# Patient Record
Sex: MALE | Race: WHITE | NOT HISPANIC OR LATINO | Employment: FULL TIME | URBAN - METROPOLITAN AREA
[De-identification: names, ages, dates, MRNs, and addresses within clinical notes are randomized per-mention and may not be internally consistent; named-entity substitution may affect disease eponyms.]

---

## 2017-07-17 ENCOUNTER — DOCUMENTATION ONLY (OUTPATIENT)
Dept: FAMILY MEDICINE | Facility: CLINIC | Age: 62
End: 2017-07-17

## 2017-12-29 ENCOUNTER — PATIENT OUTREACH (OUTPATIENT)
Dept: ADMINISTRATIVE | Facility: HOSPITAL | Age: 62
End: 2017-12-29

## 2017-12-29 NOTE — LETTER
December 29, 2017    Jermaine Henderson  [address] [facility] [address]             [facility]  [address]  [facility] [address]  Phone: [phone] Dear Mr. Henderson:    [facility] is committed to your overall health.  To help you get the most out of each of your visits, we will review your information to make sure you are up to date on all of your recommended tests and/or procedures.      Dr. Chrystal Hobson has found that your chart shows you may be due for shingles and flu immunizations.     If you have had any of the above done at another facility, please bring the records or information with you so that your record at [facility] will be complete.  If you would like to schedule any of these, please contact me.    If you are currently taking medication, please bring it with you to your appointment for review.    If you have any questions or concerns, please don't hesitate to call.    Thank you for letting us care for you,  Terrie Solo LPN Clinical Care Coordinator  [facility] [place]  [phone]

## 2018-02-19 ENCOUNTER — OFFICE VISIT (OUTPATIENT)
Dept: FAMILY MEDICINE | Facility: CLINIC | Age: 63
End: 2018-02-19
Payer: COMMERCIAL

## 2018-02-19 ENCOUNTER — HOSPITAL ENCOUNTER (OUTPATIENT)
Dept: RADIOLOGY | Facility: HOSPITAL | Age: 63
Discharge: HOME | End: 2018-02-19
Attending: FAMILY MEDICINE
Payer: COMMERCIAL

## 2018-02-19 VITALS
HEART RATE: 72 BPM | DIASTOLIC BLOOD PRESSURE: 94 MMHG | HEIGHT: 67 IN | RESPIRATION RATE: 16 BRPM | BODY MASS INDEX: 34.36 KG/M2 | TEMPERATURE: 99 F | WEIGHT: 218.94 LBS | SYSTOLIC BLOOD PRESSURE: 140 MMHG

## 2018-02-19 DIAGNOSIS — R05.9 COUGH: ICD-10-CM

## 2018-02-19 DIAGNOSIS — R03.0 ELEVATED BLOOD PRESSURE READING WITHOUT DIAGNOSIS OF HYPERTENSION: ICD-10-CM

## 2018-02-19 DIAGNOSIS — N52.9 ERECTILE DYSFUNCTION, UNSPECIFIED ERECTILE DYSFUNCTION TYPE: ICD-10-CM

## 2018-02-19 DIAGNOSIS — B00.9 HSV INFECTION: ICD-10-CM

## 2018-02-19 DIAGNOSIS — Z00.00 ANNUAL PHYSICAL EXAM: Primary | ICD-10-CM

## 2018-02-19 PROCEDURE — 71046 X-RAY EXAM CHEST 2 VIEWS: CPT | Mod: TC,FY,PO

## 2018-02-19 PROCEDURE — 90471 IMMUNIZATION ADMIN: CPT | Mod: S$GLB,,, | Performed by: FAMILY MEDICINE

## 2018-02-19 PROCEDURE — 99396 PREV VISIT EST AGE 40-64: CPT | Mod: 25,S$GLB,, | Performed by: FAMILY MEDICINE

## 2018-02-19 PROCEDURE — 71046 X-RAY EXAM CHEST 2 VIEWS: CPT | Mod: 26,,, | Performed by: RADIOLOGY

## 2018-02-19 PROCEDURE — 90686 IIV4 VACC NO PRSV 0.5 ML IM: CPT | Mod: S$GLB,,, | Performed by: FAMILY MEDICINE

## 2018-02-19 RX ORDER — ACYCLOVIR 400 MG/1
400 TABLET ORAL 2 TIMES DAILY
Qty: 180 TABLET | Refills: 3 | Status: SHIPPED | OUTPATIENT
Start: 2018-02-19 | End: 2019-04-22 | Stop reason: SDUPTHER

## 2018-02-19 RX ORDER — SILDENAFIL 100 MG/1
100 TABLET, FILM COATED ORAL DAILY PRN
Qty: 8 TABLET | Refills: 2 | Status: SHIPPED | OUTPATIENT
Start: 2018-02-19 | End: 2020-09-02 | Stop reason: SDUPTHER

## 2018-02-19 NOTE — PROGRESS NOTES
"Subjective:       Patient ID: Jermaine Henderson is a 62 y.o. male.    Chief Complaint: Annual Exam    HPI      Jermaine Henderson is a 61 y/o male with PMHx of OCTAVIO who presents today for his annual exam.  I have not seen him in over a year.  He has had a stressful time since I've seen him last including a job change.  Today we discussed the followin)  annual exam.  He realizes that he has gained weight.  He believes his weight gain is partially due to his job being more sedentary and to "twisting" his left knee.  He is going start exercising and be more physically active.  He is also going to be watching his diet.  He feels confident he can lose weight.  He is due for fasting labs and would be willing to have those done  2)  URI.  He was in his usual state of health until , when he began to experience nonproductive cough, wheeze, SOB, myalgias, sneezing, clear rhinorrhea and fatigue. He also believes he had a fever as he felt warm, but did not take his temperature. He denies sore throat, sinus pressure, itchy/watery eyes, headache, and chest pain.  This winter, he has had two other similar illnesses one in November and one in January. Both the November and January had the same symptoms as he is experiencing now and lasted approximately a week each before resolving.  For these illnesses in  now February, he has never had wheezing issues.  He did not seek treatment for the November are January illness, but has used mucinex for these episodes with some relief.  Of note, he has previously worked in a shipyard  3)  HSV.  He would like to continue with his acyclovir for suppression.  He does not believe he's had an outbreak since his last appt, but has experienced some pruritis in the scrotal area (which is different different from what he experiences with his outbreaks as his usual outbreaks are in the suprapubic/pubic area). He denies pain and has not noticed a rash.   4)  elevated blood " pressure.  His blood pressure 140/94 today.  He is surprised by that is that this is an unusual reading.  He realizes that his elevated readings today may be affected by his weight gain.  He denies any chest pain shortness of breath or dyspnea exertion  5)  OCTAVIO.  He is using CPAP consistently with no issues      Review of Systems   Constitutional: Positive for fatigue. Negative for appetite change, chills, diaphoresis, fever and unexpected weight change.   HENT: Positive for congestion, postnasal drip and rhinorrhea. Negative for dental problem, ear pain, sinus pressure, sneezing, sore throat and trouble swallowing.    Eyes: Negative for photophobia, pain, discharge and visual disturbance.   Respiratory: Positive for cough, shortness of breath and wheezing. Negative for chest tightness.    Cardiovascular: Negative for chest pain, palpitations and leg swelling.   Gastrointestinal: Negative for abdominal distention, abdominal pain, blood in stool, constipation, diarrhea, nausea and vomiting.   Endocrine: Negative for polydipsia and polyuria.   Genitourinary: Negative for discharge, dysuria, flank pain, frequency, genital sores, hematuria and testicular pain.   Musculoskeletal: Negative for arthralgias, joint swelling and myalgias.   Skin: Negative for rash.   Neurological: Negative for dizziness, syncope, weakness, light-headedness and headaches.   Hematological: Negative for adenopathy. Does not bruise/bleed easily.   Psychiatric/Behavioral: Negative for dysphoric mood, self-injury, sleep disturbance and suicidal ideas. The patient is not nervous/anxious.        Objective:      Physical Exam   Constitutional: He is oriented to person, place, and time. He appears well-developed and well-nourished. No distress.   HENT:   Head: Normocephalic and atraumatic.   Right Ear: Hearing, tympanic membrane, external ear and ear canal normal.   Left Ear: Hearing, tympanic membrane, external ear and ear canal normal.   Nose: Nose  normal.   Mouth/Throat: Oropharynx is clear and moist and mucous membranes are normal. No oral lesions. No posterior oropharyngeal edema or posterior oropharyngeal erythema.   Eyes: Conjunctivae, EOM and lids are normal. Pupils are equal, round, and reactive to light. No scleral icterus.   Neck: Normal range of motion. Neck supple. Carotid bruit is not present. No thyroid mass and no thyromegaly present.   Cardiovascular: Normal rate, regular rhythm and normal heart sounds.   No extrasystoles are present. PMI is not displaced.  Exam reveals no gallop.    No murmur heard.  Pulmonary/Chest: Effort normal and breath sounds normal. No accessory muscle usage. No respiratory distress.   Breath sounds were symmetrical with decreased breath sounds in the right lung field compared to the left lung field.  He also had faint terminal wheezing present at the end of expiration   Abdominal: Soft. Normal appearance and bowel sounds are normal. He exhibits no abdominal bruit. There is no hepatosplenomegaly. There is no tenderness. There is no rebound. Hernia confirmed negative in the right inguinal area and confirmed negative in the left inguinal area.   Genitourinary: Testes normal and penis normal. Right testis shows no mass, no swelling and no tenderness. Left testis shows no mass, no swelling and no tenderness. Circumcised.   Lymphadenopathy:        Head (right side): No submandibular adenopathy present.        Head (left side): No submandibular adenopathy present.        Right cervical: No superficial cervical, no deep cervical and no posterior cervical adenopathy present.       Left cervical: No superficial cervical, no deep cervical and no posterior cervical adenopathy present.        Right: No supraclavicular adenopathy present.        Left: No supraclavicular adenopathy present.   Neurological: He is alert and oriented to person, place, and time.   Skin: Skin is warm, dry and intact.   Psychiatric: He has a normal mood and  "affect.     Blood pressure (!) 140/94, pulse 72, temperature 98.8 °F (37.1 °C), temperature source Oral, resp. rate 16, height 5' 7" (1.702 m), weight 99.3 kg (218 lb 14.7 oz).Body mass index is 34.29 kg/m².          A/P:  1)  annual exam.  Health maintenance issues and anticipatory guidance issues were discussed.  He will receive his flu shot today.  We'll schedule fasting labs.  He'll work on diet exercise and weight loss  2)  persistent URI with abnormal lung exam.  New to me.  We will check a chest x-ray today and I will contact him with those results  3)  HSV.  Asymptomatic.  Continue with acyclovir which was refilled  4)  elevated blood pressure without the diagnosis of hypertension.  New to me.  We will have him back in 4 weeks recheck his blood pressure.  His blood pressure remains elevated, we will adjust his medication  5)  OCTAVIO.  Continue with CPAP  6)  ED.  I did refill his Viagra    "

## 2018-02-19 NOTE — MEDICAL/APP STUDENT
Subjective:       Patient ID: Jermaine Henderson is a 62 y.o. male.    Chief Complaint: Annual Exam    Jermaine Henderson is a 61 y/o male with PMHx of OCTAVIO who presents today with 6 days of URTI sxs and to discuss chronic medical issues.     Mr. Henderson was in his usual state of health until the 13th, when he began to experience nonproductive cough, wheeze, SOB, myalgias, sneezing, clear rhinorrhea and fatigue. He states he believes he had a fever as he felt warm, but did not take his temperature. He denies sore throat, sinus pressure, itchy/watery eyes, headache, and chest pain. He states that he has had two previous episodes like this recently, one in November, and one in January. Both episodes had the same sxs, lasted approximately a week each, and were self-limiting. He did not seek treatment, but has used mucinex for these episodes with some relief.     We also discussed the following chronic issues:     1. HSV  -Does not believe he has had an outbreak since last appt, but has experienced some pruritis in the scrotal area (this different from the area where he usually has his outbreaks, which is the suprapubic/pubic area). He denies pain and has not noticed a rash.   - He has been taking his acyclovir without issues     2. OCTAVIO  -Still using CPAP without issues   -Denies daytime sleepiness and headaches, feels his sleep has been satisfactory     3. Hypertension   -BP today is 140/94  -He states that this reading is unusual, but he has gained some weight recently   -Denies headache, blurry vision, chest pain, SOB, numbness or weakness       Review of Systems   Constitutional: Positive for activity change, diaphoresis, fatigue and fever (subjective). Negative for appetite change and chills.   HENT: Positive for congestion, postnasal drip, rhinorrhea and sneezing. Negative for sinus pain, sinus pressure, sore throat and trouble swallowing.    Eyes: Negative for visual disturbance.   Respiratory: Positive for cough,  shortness of breath and wheezing. Negative for apnea.    Cardiovascular: Negative for chest pain and palpitations.   Gastrointestinal: Negative for abdominal pain, constipation, diarrhea, nausea and vomiting.   Genitourinary: Negative for dysuria and hematuria.   Musculoskeletal: Positive for myalgias. Negative for arthralgias.   Skin: Negative for rash.   Neurological: Negative for dizziness, syncope, weakness, numbness and headaches.       Objective:       Vitals:    02/19/18 0723   BP: (!) 140/94   Pulse: 72   Resp: 16   Temp: 98.8 °F (37.1 °C)       Physical Exam   Constitutional: He is oriented to person, place, and time. No distress.   HENT:   Head: Normocephalic and atraumatic.   Right Ear: External ear normal.   Left Ear: External ear normal.   Mouth/Throat: Oropharynx is clear and moist. No oropharyngeal exudate.   Eyes: Conjunctivae and EOM are normal. Pupils are equal, round, and reactive to light. Right eye exhibits no discharge. Left eye exhibits no discharge.   Neck: No thyromegaly present.   Cardiovascular: Normal rate, regular rhythm, normal heart sounds and intact distal pulses.  Exam reveals no gallop and no friction rub.    No murmur heard.  Pulmonary/Chest: Effort normal. He has wheezes.   Coarse breath sounds on the left side, decreased on the right     Abdominal: Soft. Bowel sounds are normal. He exhibits no mass. There is no tenderness.   Lymphadenopathy:     He has no cervical adenopathy.   Neurological: He is alert and oriented to person, place, and time.   Skin: Skin is warm and dry. He is not diaphoretic.   Psychiatric: He has a normal mood and affect. His behavior is normal.       Assessment:       1. Annual physical exam    2. Cough        Plan:       1. URTI  -Likely viral etiology, continue with mucinex    -However, concerning that this is his 3rd such illness in 4 months   -CXR to r/o underlying lung disease     2. Hypertension   -BP check in 4w to determine chronicity, will consider  medication at that time if still high     3. HSV infection  -stable, continue acyclovir 400mg PO BID as before     4. OCTAVIO  -stable, continue with CPAP, continue to monitor     5. Health maintenance   -CBC, CMP, HbA1c, Lipids, TSH  -Flu shot    Payton Stein-Maxwell M4

## 2018-02-20 ENCOUNTER — TELEPHONE (OUTPATIENT)
Dept: FAMILY MEDICINE | Facility: CLINIC | Age: 63
End: 2018-02-20

## 2018-02-20 DIAGNOSIS — Z12.5 SCREENING FOR PROSTATE CANCER: ICD-10-CM

## 2018-02-20 DIAGNOSIS — E83.51 HYPOCALCEMIA: ICD-10-CM

## 2018-02-20 DIAGNOSIS — R09.89 ABNORMAL LUNG SOUNDS: Primary | ICD-10-CM

## 2018-02-20 DIAGNOSIS — Z77.090 ASBESTOS EXPOSURE: ICD-10-CM

## 2018-02-20 DIAGNOSIS — J41.1 BRONCHITIS, MUCOPURULENT RECURRENT: ICD-10-CM

## 2018-02-20 NOTE — TELEPHONE ENCOUNTER
Pls notify pt:    CXR shows right lower lung area is more closed that it should be.  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  For prediabetes, work on weight loss as this will help us from moving towards diabetes  For low Ca levels, let's check additional labs for this.

## 2018-02-22 ENCOUNTER — HOSPITAL ENCOUNTER (OUTPATIENT)
Dept: RADIOLOGY | Facility: HOSPITAL | Age: 63
Discharge: HOME | End: 2018-02-22
Attending: FAMILY MEDICINE
Payer: COMMERCIAL

## 2018-02-22 DIAGNOSIS — Z77.090 ASBESTOS EXPOSURE: ICD-10-CM

## 2018-02-22 DIAGNOSIS — J41.1 BRONCHITIS, MUCOPURULENT RECURRENT: ICD-10-CM

## 2018-02-22 DIAGNOSIS — R09.89 ABNORMAL LUNG SOUNDS: ICD-10-CM

## 2018-02-22 PROCEDURE — 71250 CT THORAX DX C-: CPT | Mod: TC,PO

## 2018-02-22 PROCEDURE — 71250 CT THORAX DX C-: CPT | Mod: 26,,, | Performed by: RADIOLOGY

## 2018-02-27 ENCOUNTER — TELEPHONE (OUTPATIENT)
Dept: FAMILY MEDICINE | Facility: CLINIC | Age: 63
End: 2018-02-27

## 2018-02-27 DIAGNOSIS — I25.84 CORONARY ARTERY CALCIFICATION: ICD-10-CM

## 2018-02-27 DIAGNOSIS — E55.9 VITAMIN D DEFICIENCY: Primary | ICD-10-CM

## 2018-02-27 DIAGNOSIS — I25.10 CORONARY ARTERY CALCIFICATION: ICD-10-CM

## 2018-02-27 RX ORDER — CHOLECALCIFEROL (VITAMIN D3) 1250 MCG
1 TABLET ORAL WEEKLY
Qty: 8 TABLET | Refills: 0 | Status: SHIPPED | OUTPATIENT
Start: 2018-02-27 | End: 2018-04-18

## 2018-02-27 NOTE — TELEPHONE ENCOUNTER
Pls notify pt:    CT shows some changes from scarring and from a condition called bronchiectasis.  I suggest you meet with the lung doctor for further eval given this finding and your recurrent illnesses this winter.  (Pls give him contact info for pulm group so he can contact them for appt and let's send his CT report to that office.)  How is your cough and chest congestion currently?    CT also shows some calcifications in the coronary arteries.  I recommend you see the cardiologist to be considered for a stress test to see if any of these calcifications are causing significant blockage    Labs show Ca level is better than was last seen but your have a low vit D level.  Let's start vitamin D 50,000 units by mouth once a week x 8  weeks and then we'll recheck a vitamin D level.    Given these findings, let's asiya fu here in ~ 8 weeks after repeat labs

## 2018-02-27 NOTE — TELEPHONE ENCOUNTER
Pt verbalized understanding of results and recommendations.   Pulmonary phone # given with [facility] pul and cardiology sched.

## 2018-02-27 NOTE — TELEPHONE ENCOUNTER
----- Message from Cammy Fu sent at 2/27/2018  7:58 AM CST -----  Contact: [phone]  Patient is requesting a call back from the nurse.  Please call the patient upon request at phone number [phone].

## 2018-03-06 ENCOUNTER — OFFICE VISIT (OUTPATIENT)
Dept: CARDIOLOGY | Facility: CLINIC | Age: 63
End: 2018-03-06
Payer: COMMERCIAL

## 2018-03-06 VITALS
DIASTOLIC BLOOD PRESSURE: 88 MMHG | BODY MASS INDEX: 35.02 KG/M2 | WEIGHT: 223.13 LBS | SYSTOLIC BLOOD PRESSURE: 148 MMHG | HEIGHT: 67 IN | HEART RATE: 88 BPM

## 2018-03-06 DIAGNOSIS — M25.569 CHRONIC KNEE PAIN, UNSPECIFIED LATERALITY: ICD-10-CM

## 2018-03-06 DIAGNOSIS — Z82.49 FAMILY HISTORY OF CORONARY ARTERY DISEASE: ICD-10-CM

## 2018-03-06 DIAGNOSIS — R03.0 ELEVATED BLOOD PRESSURE READING WITHOUT DIAGNOSIS OF HYPERTENSION: ICD-10-CM

## 2018-03-06 DIAGNOSIS — I10 ESSENTIAL HYPERTENSION: ICD-10-CM

## 2018-03-06 DIAGNOSIS — G89.29 CHRONIC KNEE PAIN, UNSPECIFIED LATERALITY: ICD-10-CM

## 2018-03-06 DIAGNOSIS — I25.84 CORONARY ARTERY CALCIFICATION: ICD-10-CM

## 2018-03-06 DIAGNOSIS — Z91.89 AT RISK FOR CORONARY ARTERY DISEASE: ICD-10-CM

## 2018-03-06 DIAGNOSIS — E66.3 OVER WEIGHT: ICD-10-CM

## 2018-03-06 DIAGNOSIS — I25.10 CORONARY ARTERY CALCIFICATION: ICD-10-CM

## 2018-03-06 PROCEDURE — 93000 ELECTROCARDIOGRAM COMPLETE: CPT | Mod: S$GLB,,, | Performed by: INTERNAL MEDICINE

## 2018-03-06 PROCEDURE — 99999 PR PBB SHADOW E&M-EST. PATIENT-LVL III: CPT | Mod: PBBFAC,,, | Performed by: INTERNAL MEDICINE

## 2018-03-06 PROCEDURE — 99204 OFFICE O/P NEW MOD 45 MIN: CPT | Mod: S$GLB,,, | Performed by: INTERNAL MEDICINE

## 2018-03-06 NOTE — LETTER
March 6, 2018      Chrystal Hobson MD  [id number] [address] [address]           [facility]  [address] [facility] [address] [place] [address]  Phone: [phone]          Patient: Jermaine Henderson   MR Number: [id number]   YOB: 1955   Date of Visit: 3/6/2018       Dear Dr. Chrystal Hobson:    Thank you for referring Jermaine Henderson to me for evaluation. Attached you will find relevant portions of my assessment and plan of care.    If you have questions, please do not hesitate to call me. I look forward to following Jermaine Henderson along with you.    Sincerely,    Macario Olmedo MD    Enclosure  CC:  No Recipients    If you would like to receive this communication electronically, please contact [email or web address] or [phone] to request more information on [employer] access.    For providers and/or their staff who would like to refer a patient to [facility], please contact us through our one-stop-shop provider referral line, [facility], at [phone].    If you feel you have received this communication in error or would no longer like to receive these types of communications, please e-mail [email or web address]

## 2018-03-06 NOTE — PROGRESS NOTES
"Subjective:    Patient ID:  Jermaine Henderson is a 62 y.o. male who presents for evaluation of Hypertension (cardiac evaluation- recent calcification on Ct scan)      Pt had a flu like illness and has a persistent cough. A chest CT was ordered and "minimal" coronary calcifications noted. He has no significant PMH or previous heart issues. He has a family h/o early CAD. He reports no symptoms at present but admits he is not very active at present. He has some persistent knee problems which also limits him at times.         Review of Systems   Constitution: Negative for weight gain and weight loss.   HENT: Negative.    Eyes: Negative.    Cardiovascular: Negative for chest pain, claudication, cyanosis, dyspnea on exertion, irregular heartbeat, leg swelling, near-syncope, orthopnea (no PND), palpitations and syncope.   Respiratory: Negative for cough, hemoptysis, shortness of breath and snoring.    Endocrine: Negative.    Skin: Negative.    Musculoskeletal: Positive for arthritis and joint pain. Negative for muscle cramps, muscle weakness and myalgias.   Gastrointestinal: Negative for diarrhea, hematemesis, nausea and vomiting.   Genitourinary: Negative.    Neurological: Negative for dizziness, focal weakness, light-headedness, loss of balance, numbness, paresthesias and seizures.   Psychiatric/Behavioral: Negative.         Objective:    Physical Exam   Constitutional: He is oriented to person, place, and time. He appears well-developed and well-nourished.   HENT:   Mouth/Throat: Oropharynx is clear and moist.   Eyes: Pupils are equal, round, and reactive to light.   Neck: Normal range of motion. No thyromegaly present.   Cardiovascular: Normal rate, regular rhythm, S1 normal, S2 normal, normal heart sounds, intact distal pulses and normal pulses.   No extrasystoles are present. PMI is not displaced.  Exam reveals no friction rub.    No murmur heard.  Pulmonary/Chest: Effort normal and breath sounds normal. He has no " wheezes. He has no rales. He exhibits no tenderness.   Abdominal: Soft. Bowel sounds are normal. He exhibits no distension and no mass. There is no tenderness.   Musculoskeletal: Normal range of motion. He exhibits no edema.   Neurological: He is alert and oriented to person, place, and time.   Skin: Skin is warm and dry.   Vitals reviewed.      Test(s) Reviewed  I have reviewed the following in detail:  [] Stress test   [] Angiography   [] Echocardiogram   [x] Labs   [x] Other:  ECG       Assessment:       1. Elevated blood pressure reading without diagnosis of hypertension    2. Coronary artery calcification    3. Family history of coronary artery disease    4. Chronic knee pain, unspecified laterality    5. Over weight    6. At risk for coronary artery disease         Plan:       We discussed the significance of coronary Ca++ as an indicator of atherosclerosis.  We discussed his family h/o CAD and his other coronary risk factors.  We discussed monitoring his BP   We discussed lifestyle changes including diet, exercise and weight loss  Echo  Pharmacologic SPECT stress

## 2018-03-08 DIAGNOSIS — I10 ESSENTIAL HYPERTENSION: Primary | ICD-10-CM

## 2018-03-15 ENCOUNTER — TELEPHONE (OUTPATIENT)
Dept: CARDIOLOGY | Facility: CLINIC | Age: 63
End: 2018-03-15

## 2018-03-15 NOTE — TELEPHONE ENCOUNTER
----- Message from Krista Mckinley sent at 3/15/2018  3:42 PM CDT -----  Contact: self   Patient want to speak with a nurse regarding upcoming appointment, want to know what should he be prepared for please call back at [phone]

## 2018-03-19 ENCOUNTER — CLINICAL SUPPORT (OUTPATIENT)
Dept: CARDIOLOGY | Facility: CLINIC | Age: 63
End: 2018-03-19
Attending: INTERNAL MEDICINE
Payer: COMMERCIAL

## 2018-03-19 ENCOUNTER — HOSPITAL ENCOUNTER (OUTPATIENT)
Dept: RADIOLOGY | Facility: HOSPITAL | Age: 63
Discharge: HOME | End: 2018-03-19
Attending: INTERNAL MEDICINE
Payer: COMMERCIAL

## 2018-03-19 DIAGNOSIS — I25.10 CORONARY ARTERY CALCIFICATION: ICD-10-CM

## 2018-03-19 DIAGNOSIS — I25.84 CORONARY ARTERY CALCIFICATION: ICD-10-CM

## 2018-03-19 DIAGNOSIS — Z91.89 AT RISK FOR CORONARY ARTERY DISEASE: ICD-10-CM

## 2018-03-19 DIAGNOSIS — M25.569 CHRONIC KNEE PAIN, UNSPECIFIED LATERALITY: ICD-10-CM

## 2018-03-19 DIAGNOSIS — Z82.49 FAMILY HISTORY OF CORONARY ARTERY DISEASE: ICD-10-CM

## 2018-03-19 DIAGNOSIS — R03.0 ELEVATED BLOOD PRESSURE READING WITHOUT DIAGNOSIS OF HYPERTENSION: ICD-10-CM

## 2018-03-19 DIAGNOSIS — E66.3 OVER WEIGHT: ICD-10-CM

## 2018-03-19 DIAGNOSIS — G89.29 CHRONIC KNEE PAIN, UNSPECIFIED LATERALITY: ICD-10-CM

## 2018-03-19 PROCEDURE — 93306 TTE W/DOPPLER COMPLETE: CPT | Mod: S$GLB,,, | Performed by: INTERNAL MEDICINE

## 2018-03-19 PROCEDURE — 93015 CV STRESS TEST SUPVJ I&R: CPT | Mod: S$GLB,,, | Performed by: INTERNAL MEDICINE

## 2018-03-19 PROCEDURE — 78452 HT MUSCLE IMAGE SPECT MULT: CPT | Mod: 26,,, | Performed by: INTERNAL MEDICINE

## 2018-03-20 LAB
DIASTOLIC DYSFUNCTION: NO
DIASTOLIC DYSFUNCTION: NO
ESTIMATED PA SYSTOLIC PRESSURE: 21.66
RETIRED EF AND QEF - SEE NOTES: 65 (ref 55–65)
TRICUSPID VALVE REGURGITATION: NORMAL

## 2018-03-21 ENCOUNTER — TELEPHONE (OUTPATIENT)
Dept: CARDIOLOGY | Facility: CLINIC | Age: 63
End: 2018-03-21

## 2018-04-14 ENCOUNTER — LAB VISIT (OUTPATIENT)
Dept: LAB | Facility: HOSPITAL | Age: 63
End: 2018-04-14
Attending: FAMILY MEDICINE
Payer: COMMERCIAL

## 2018-04-14 DIAGNOSIS — E55.9 VITAMIN D DEFICIENCY: ICD-10-CM

## 2018-04-14 LAB
25(OH)D3+25(OH)D2 SERPL-MCNC: 48 NG/ML
ANION GAP SERPL CALC-SCNC: 8 MMOL/L
BUN SERPL-MCNC: 20 MG/DL
CALCIUM SERPL-MCNC: 9.6 MG/DL
CHLORIDE SERPL-SCNC: 104 MMOL/L
CO2 SERPL-SCNC: 24 MMOL/L
CREAT SERPL-MCNC: 1.2 MG/DL
EST. GFR  (AFRICAN AMERICAN): >60 ML/MIN/1.73 M^2
EST. GFR  (NON AFRICAN AMERICAN): >60 ML/MIN/1.73 M^2
GLUCOSE SERPL-MCNC: 99 MG/DL
POTASSIUM SERPL-SCNC: 4.8 MMOL/L
SODIUM SERPL-SCNC: 136 MMOL/L

## 2018-04-14 PROCEDURE — 82306 VITAMIN D 25 HYDROXY: CPT

## 2018-04-14 PROCEDURE — 80048 BASIC METABOLIC PNL TOTAL CA: CPT

## 2018-04-16 ENCOUNTER — TELEPHONE (OUTPATIENT)
Dept: FAMILY MEDICINE | Facility: CLINIC | Age: 63
End: 2018-04-16

## 2018-04-16 NOTE — TELEPHONE ENCOUNTER
Pls notify pt:    Labs show vit D land Ca evels are back to normal    Let's start vit d 800 IUs a day

## 2018-04-16 NOTE — TELEPHONE ENCOUNTER
Pt called and advised to keep his appointment to review bp, pt confirms he will be here for his appointment

## 2018-04-16 NOTE — TELEPHONE ENCOUNTER
Spoke with pt re Vit D labs.    He had an appointment April 26th to F/U vit D. Do you need him to keep it for other reasons? He has lost 18lbs.

## 2018-04-26 ENCOUNTER — OFFICE VISIT (OUTPATIENT)
Dept: FAMILY MEDICINE | Facility: CLINIC | Age: 63
End: 2018-04-26
Payer: COMMERCIAL

## 2018-04-26 VITALS
RESPIRATION RATE: 18 BRPM | HEART RATE: 72 BPM | DIASTOLIC BLOOD PRESSURE: 90 MMHG | TEMPERATURE: 98 F | BODY MASS INDEX: 31.96 KG/M2 | WEIGHT: 203.63 LBS | SYSTOLIC BLOOD PRESSURE: 132 MMHG | HEIGHT: 67 IN

## 2018-04-26 DIAGNOSIS — I10 HYPERTENSION, ESSENTIAL: Primary | ICD-10-CM

## 2018-04-26 DIAGNOSIS — I25.10 CORONARY ARTERY CALCIFICATION: ICD-10-CM

## 2018-04-26 DIAGNOSIS — E66.9 OBESITY (BMI 30-39.9): ICD-10-CM

## 2018-04-26 DIAGNOSIS — E78.5 HYPERLIPIDEMIA, UNSPECIFIED HYPERLIPIDEMIA TYPE: ICD-10-CM

## 2018-04-26 DIAGNOSIS — E55.9 VITAMIN D DEFICIENCY: ICD-10-CM

## 2018-04-26 DIAGNOSIS — I25.84 CORONARY ARTERY CALCIFICATION: ICD-10-CM

## 2018-04-26 PROCEDURE — 3080F DIAST BP >= 90 MM HG: CPT | Mod: CPTII,S$GLB,, | Performed by: FAMILY MEDICINE

## 2018-04-26 PROCEDURE — 99214 OFFICE O/P EST MOD 30 MIN: CPT | Mod: S$GLB,,, | Performed by: FAMILY MEDICINE

## 2018-04-26 PROCEDURE — 3075F SYST BP GE 130 - 139MM HG: CPT | Mod: CPTII,S$GLB,, | Performed by: FAMILY MEDICINE

## 2018-04-26 RX ORDER — VALSARTAN 80 MG/1
80 TABLET ORAL DAILY
Qty: 30 TABLET | Refills: 1 | Status: SHIPPED | OUTPATIENT
Start: 2018-04-26 | End: 2018-06-21 | Stop reason: SDUPTHER

## 2018-04-26 RX ORDER — ROSUVASTATIN CALCIUM 10 MG/1
10 TABLET, COATED ORAL
Qty: 36 TABLET | Refills: 0 | Status: SHIPPED | OUTPATIENT
Start: 2018-04-27 | End: 2018-06-21 | Stop reason: SDUPTHER

## 2018-04-26 NOTE — PATIENT INSTRUCTIONS
The 10-year CVD risk score (Cm et al., 2008) is: 23.3%    Values used to calculate the score:      Age: 62 years      Sex: Male      Diabetic: No      Tobacco smoker: No      Systolic Blood Pressure: 132 mmHg      Is BP treated: Yes      HDL Cholesterol: 34 mg/dL      Total Cholesterol: 173 mg/dL

## 2018-04-28 NOTE — PROGRESS NOTES
Subjective:       Patient ID: Jermaine Henderson is a 62 y.o. male.    Chief Complaint: Follow-up    HPI   The patient is a 62-year-old who is here today for short-term follow-up.  Since I've seen him last, he is working hard to lose weight.  He has been counting his calories.  Since our visit in February, he has lost 15 pounds.  He believes he can lose more weight and is continuing his efforts with diet exercise and weight loss    Today we discussed the followin)  elevated blood pressure.  At his last visit, his blood pressure was elevated.  It was also elevated at a recent cardiology visit.  It is elevated again today.  We did discuss starting a medicine which he would be willing to do  2)  prediabetes.  We did discuss his recent A1c of 5.7.  He has been working to lose weight.  He denies any polyuria, polydipsia or polyphagia  3)  vitamin D deficiency.  We did discuss his recent vitamin D deficiency.  His vitamin D was 11 in February and most recently was 48.  4)  fatty liver.  We did discuss the fatty liver noted on imaging.  He is working at diet exercise and weight loss  5)  coronary artery calcifications.  He did meet with the cardiologist and had a stress test which was normal  6)  abnormal lung CT.  He did meet with the pulmonologist and his evaluation there was largely unremarkable  7)  hyperlipidemia.  We did review his recent cholesterol numbers.  His 10 year cardiovascular risk score is 23%.  Given the coronary artery calcifications, we did discuss a cholesterol medicine which he is willing to do      Review of Systems   Constitutional: Negative for appetite change, chills, diaphoresis, fatigue, fever and unexpected weight change.   HENT: Negative for congestion, ear pain, postnasal drip, rhinorrhea, sinus pressure, sneezing, sore throat and trouble swallowing.    Eyes: Negative for pain, discharge and visual disturbance.   Respiratory: Negative for cough, chest tightness, shortness of breath and  wheezing.    Cardiovascular: Negative for chest pain, palpitations and leg swelling.   Gastrointestinal: Negative for abdominal distention, abdominal pain, blood in stool, constipation, diarrhea, nausea and vomiting.   Skin: Negative for rash.       Objective:      Physical Exam   Constitutional: He is oriented to person, place, and time. He appears well-developed and well-nourished. No distress.   HENT:   Head: Normocephalic and atraumatic.   Right Ear: Hearing, tympanic membrane, external ear and ear canal normal.   Left Ear: Hearing, tympanic membrane, external ear and ear canal normal.   Nose: Nose normal.   Mouth/Throat: Oropharynx is clear and moist and mucous membranes are normal. No oral lesions. No oropharyngeal exudate, posterior oropharyngeal edema or posterior oropharyngeal erythema.   Eyes: Conjunctivae, EOM and lids are normal. Pupils are equal, round, and reactive to light. No scleral icterus.   Neck: Normal range of motion. Neck supple. Carotid bruit is not present. No thyroid mass and no thyromegaly present.   Cardiovascular: Normal rate, regular rhythm and normal heart sounds.   No extrasystoles are present. PMI is not displaced.  Exam reveals no gallop.    No murmur heard.  Pulmonary/Chest: Effort normal and breath sounds normal. No accessory muscle usage. No respiratory distress.   Clear to auscultation bilaterally.   Abdominal: Soft. Normal appearance and bowel sounds are normal. He exhibits no abdominal bruit. There is no hepatosplenomegaly. There is no tenderness. There is no rebound.   Lymphadenopathy:        Head (right side): No submental and no submandibular adenopathy present.        Head (left side): No submental and no submandibular adenopathy present.        Right cervical: No superficial cervical, no deep cervical and no posterior cervical adenopathy present.       Left cervical: No superficial cervical, no deep cervical and no posterior cervical adenopathy present.        Right: No  "supraclavicular adenopathy present.        Left: No supraclavicular adenopathy present.   Neurological: He is alert and oriented to person, place, and time.   Skin: Skin is warm, dry and intact.   Psychiatric: He has a normal mood and affect.     Blood pressure (!) 132/90, pulse 72, temperature 98.3 °F (36.8 °C), temperature source Oral, resp. rate 18, height 5' 7" (1.702 m), weight 92.4 kg (203 lb 9.6 oz).Body mass index is 31.89 kg/m².          A/P:  1)  obesity.  Improved.  I did congratulate him on the progress he has made.  He will continue his efforts with diet exercise and weight loss  2)  hypertension.  New.  We are going to start Diovan 80 mg once a day.  We will have him back to check blood pressure with the nurses in 2 weeks and we will make further adjustments if needed  3)  prediabetes.  New.  He will work on diet exercise and weight loss.  We'll recheck an A1c in 6-12 months  4)  vitamin D deficiency.  Improved.  Continue with vitamin D 800 international units once a day  5)  coronary artery calcifications.  Asymptomatic.  We will work to control risk factors for further atherosclerotic disease  6)  abnormal lung CT.  Follow up with pulmonology as recommended  7)  hyperlipidemia.  New.  We will start him on Crestor 10 mg 3 times a day.  We will recheck fasting labs in 3 months  "

## 2018-05-09 ENCOUNTER — TELEPHONE (OUTPATIENT)
Dept: FAMILY MEDICINE | Facility: CLINIC | Age: 63
End: 2018-05-09

## 2018-05-09 NOTE — TELEPHONE ENCOUNTER
----- Message from Komal Ogden sent at 5/9/2018  4:40 PM CDT -----  Contact: pt  Pt is calling states will not be able to make his nurse visit for tomorrow 5/10 needs to reschedule....[phone] (home)

## 2018-05-10 NOTE — TELEPHONE ENCOUNTER
Pt contacted, reports he is canceling his appointment for his BP check for today. Pt reports that he will call next week for scheduling.

## 2018-06-20 ENCOUNTER — TELEPHONE (OUTPATIENT)
Dept: FAMILY MEDICINE | Facility: CLINIC | Age: 63
End: 2018-06-20

## 2018-06-20 NOTE — TELEPHONE ENCOUNTER
----- Message from Isaura Blank sent at 6/20/2018  9:54 AM CDT -----  Contact: Patient  Jermaine, patient [phone] calling to schedule a nurse visit to have his blood pressure checked, requesting tomorrow around 8:15-8:30 am. Please advise. Thanks.

## 2018-06-21 ENCOUNTER — CLINICAL SUPPORT (OUTPATIENT)
Dept: FAMILY MEDICINE | Facility: CLINIC | Age: 63
End: 2018-06-21
Payer: COMMERCIAL

## 2018-06-21 DIAGNOSIS — I10 HYPERTENSION, UNSPECIFIED TYPE: Primary | ICD-10-CM

## 2018-06-21 RX ORDER — ROSUVASTATIN CALCIUM 10 MG/1
10 TABLET, COATED ORAL
Qty: 36 TABLET | Refills: 0 | Status: CANCELLED | OUTPATIENT
Start: 2018-06-22 | End: 2019-06-22

## 2018-06-21 RX ORDER — ROSUVASTATIN CALCIUM 10 MG/1
10 TABLET, COATED ORAL
Qty: 36 TABLET | Refills: 0 | Status: SHIPPED | OUTPATIENT
Start: 2018-06-22 | End: 2018-12-30 | Stop reason: SDUPTHER

## 2018-06-21 RX ORDER — VALSARTAN 80 MG/1
80 TABLET ORAL DAILY
Qty: 90 TABLET | Refills: 1 | Status: CANCELLED | OUTPATIENT
Start: 2018-06-21 | End: 2019-06-21

## 2018-06-21 RX ORDER — VALSARTAN 80 MG/1
80 TABLET ORAL DAILY
Qty: 90 TABLET | Refills: 1 | Status: SHIPPED | OUTPATIENT
Start: 2018-06-21 | End: 2018-09-25

## 2018-06-21 NOTE — PROGRESS NOTES
Jermaine GRACIA Beatriz 62 y.o. male is here today for Blood Pressure check.   History of HTN yes.    Review of patient's allergies indicates:  No Known Allergies  Creatinine   Date Value Ref Range Status   04/14/2018 1.2 0.5 - 1.4 mg/dL Final     Sodium   Date Value Ref Range Status   04/14/2018 136 136 - 145 mmol/L Final     Potassium   Date Value Ref Range Status   04/14/2018 4.8 3.5 - 5.1 mmol/L Final   ]  Patient denies taking blood pressure medications on a regular basis at the same time of the day.     Current Outpatient Prescriptions:     acyclovir (ZOVIRAX) 400 MG tablet, Take 1 tablet (400 mg total) by mouth 2 (two) times daily., Disp: 180 tablet, Rfl: 3    rosuvastatin (CRESTOR) 10 MG tablet, Take 1 tablet (10 mg total) by mouth 3 (three) times a week., Disp: 36 tablet, Rfl: 0    sildenafil (VIAGRA) 100 MG tablet, Take 1 tablet (100 mg total) by mouth daily as needed for Erectile Dysfunction., Disp: 8 tablet, Rfl: 2    valsartan (DIOVAN) 80 MG tablet, Take 1 tablet (80 mg total) by mouth once daily., Disp: 30 tablet, Rfl: 1  Does patient have record of home blood pressure readings no. Readings have been averaging .   Last dose of blood pressure medication was taken at 0600.  Patient is asymptomatic.   .      ,   .    Blood pressure reading was 112/80/  Dr. Cherry notified.

## 2018-06-25 ENCOUNTER — TELEPHONE (OUTPATIENT)
Dept: FAMILY MEDICINE | Facility: CLINIC | Age: 63
End: 2018-06-25

## 2018-06-26 NOTE — TELEPHONE ENCOUNTER
Notified pt of provider instructions. Pt stated verbal understanding. Pt inquiring about why pharmacy would not fill Crestor. Spoke with pharmacy. Pharmacy stated the earliest rx could be filled is July 5th. Notified pt. Pt stated verbal understanding. Appointment scheduled. Time and date confirmed with pt.

## 2018-07-16 RX ORDER — ROSUVASTATIN CALCIUM 10 MG/1
TABLET, COATED ORAL
Qty: 36 TABLET | Refills: 0 | Status: SHIPPED | OUTPATIENT
Start: 2018-07-16 | End: 2018-11-06 | Stop reason: SDUPTHER

## 2018-09-25 ENCOUNTER — TELEPHONE (OUTPATIENT)
Dept: FAMILY MEDICINE | Facility: CLINIC | Age: 63
End: 2018-09-25

## 2018-09-25 RX ORDER — LOSARTAN POTASSIUM 50 MG/1
50 TABLET ORAL DAILY
Qty: 30 TABLET | Refills: 0 | Status: SHIPPED | OUTPATIENT
Start: 2018-09-25 | End: 2018-10-25 | Stop reason: SDUPTHER

## 2018-09-25 NOTE — TELEPHONE ENCOUNTER
We can change to cozaar  Sandra BP check with nurses in 2 wks to see how he is doing with this dose of med

## 2018-09-25 NOTE — TELEPHONE ENCOUNTER
Spoke to pt.  He said the pharmacy stated Valsartan is recalled and requesting alternative med to be sent to his [facility] in [place].  Pt is out of his med.

## 2018-09-25 NOTE — TELEPHONE ENCOUNTER
----- Message from Krista Mckinley sent at 9/25/2018  8:20 AM CDT -----  Contact: self   Patient want to know if you can replace his valsartan was recalled per pharmacy, patient is out of medication please send new rx to [facility], any questions please call back at [phone] (home)       [facility] [address] [place] [address] [address] [place] [address] AT [address] [address]  Phone: [phone] Fax: [phone]

## 2018-10-09 ENCOUNTER — CLINICAL SUPPORT (OUTPATIENT)
Dept: FAMILY MEDICINE | Facility: CLINIC | Age: 63
End: 2018-10-09
Payer: COMMERCIAL

## 2018-10-09 DIAGNOSIS — I10 HYPERTENSION, ESSENTIAL: Primary | ICD-10-CM

## 2018-10-09 NOTE — PROGRESS NOTES
Jermaine GRACIA FloresHenderson 63 y.o. male is here today for Blood Pressure check.   History of HTN yes.    Review of patient's allergies indicates:  No Known Allergies  Creatinine   Date Value Ref Range Status   04/14/2018 1.2 0.5 - 1.4 mg/dL Final     Sodium   Date Value Ref Range Status   04/14/2018 136 136 - 145 mmol/L Final     Potassium   Date Value Ref Range Status   04/14/2018 4.8 3.5 - 5.1 mmol/L Final   ]  Patient verifies taking blood pressure medications on a regular basis at the same time of the day.     Current Outpatient Medications:     acyclovir (ZOVIRAX) 400 MG tablet, Take 1 tablet (400 mg total) by mouth 2 (two) times daily., Disp: 180 tablet, Rfl: 3    losartan (COZAAR) 50 MG tablet, Take 1 tablet (50 mg total) by mouth once daily., Disp: 30 tablet, Rfl: 0    rosuvastatin (CRESTOR) 10 MG tablet, Take 1 tablet (10 mg total) by mouth 3 (three) times a week., Disp: 36 tablet, Rfl: 0    rosuvastatin (CRESTOR) 10 MG tablet, TAKE 1 TABLET(10 MG) BY MOUTH 3 TIMES A WEEK, Disp: 36 tablet, Rfl: 0    sildenafil (VIAGRA) 100 MG tablet, Take 1 tablet (100 mg total) by mouth daily as needed for Erectile Dysfunction., Disp: 8 tablet, Rfl: 2  Does patient have record of home blood pressure readings yes. Readings have been averaging 130/80, 128/82.   Last dose of blood pressure medication was taken at 0600  Patient is asymptomatic.   Complains of na.      ,   .    Blood pressure reading 120/82  Dr. Hobson notified.

## 2018-10-10 RX ORDER — ROSUVASTATIN CALCIUM 10 MG/1
10 TABLET, COATED ORAL
Qty: 36 TABLET | Refills: 0 | Status: CANCELLED | OUTPATIENT
Start: 2018-10-10 | End: 2019-10-10

## 2018-10-10 NOTE — TELEPHONE ENCOUNTER
----- Message from Eve Trinidad sent at 10/10/2018  1:23 PM CDT -----  Type:  RX Refill Request    Who Called:  Patient   Refill or New Rx:  refill  RX Name and Strength: rosuvastatin (CRESTOR) 10 MG tablet  How is the patient currently taking it? (ex. 1XDay):  3 x week9  Is this a 30 day or 90 day RX:  90  Preferred Pharmacy with phone number:    [facility] [address] [place] [address] [address] [place] [address]  Phone: [phone] Fax: [phone]  Local or Mail Order:  local  Ordering Provider:  Chrystal Hobson  Best Call Back Number:  [phone]  Additional Information:

## 2018-10-12 NOTE — TELEPHONE ENCOUNTER
"Patient declined to reschedule labs due to "unpredictable schedule right now." Patient opted to wait until office visit on 10/19.   "

## 2018-10-25 NOTE — TELEPHONE ENCOUNTER
----- Message from Jeny Rain sent at 10/25/2018  9:01 AM CDT -----  Contact: Pt  Can the clinic reply in [facility]:      Please refill the medication(s) listed below. Please call the patient when the prescription(s) is ready for  at this phone number .[phone] (home)       Medication #1 losartan (COZAAR) 50 MG tablet      Preferred Pharmacy:   [facility] [address] [place] [id number]  Phone: [phone] Fax: [phone]

## 2018-10-26 RX ORDER — LOSARTAN POTASSIUM 50 MG/1
50 TABLET ORAL DAILY
Qty: 30 TABLET | Refills: 1 | Status: SHIPPED | OUTPATIENT
Start: 2018-10-26 | End: 2018-12-21 | Stop reason: SDUPTHER

## 2018-10-30 ENCOUNTER — LAB VISIT (OUTPATIENT)
Dept: LAB | Facility: HOSPITAL | Age: 63
End: 2018-10-30
Attending: FAMILY MEDICINE
Payer: COMMERCIAL

## 2018-10-30 ENCOUNTER — TELEPHONE (OUTPATIENT)
Dept: FAMILY MEDICINE | Facility: CLINIC | Age: 63
End: 2018-10-30

## 2018-10-30 DIAGNOSIS — E78.5 HYPERLIPIDEMIA, UNSPECIFIED HYPERLIPIDEMIA TYPE: ICD-10-CM

## 2018-10-30 LAB
ALBUMIN SERPL BCP-MCNC: 3.9 G/DL
ALP SERPL-CCNC: 58 U/L
ALT SERPL W/O P-5'-P-CCNC: 24 U/L
ANION GAP SERPL CALC-SCNC: 5 MMOL/L
AST SERPL-CCNC: 21 U/L
BILIRUB SERPL-MCNC: 1.5 MG/DL
BUN SERPL-MCNC: 16 MG/DL
CALCIUM SERPL-MCNC: 9.6 MG/DL
CHLORIDE SERPL-SCNC: 105 MMOL/L
CHOLEST SERPL-MCNC: 132 MG/DL
CHOLEST/HDLC SERPL: 2.8 {RATIO}
CO2 SERPL-SCNC: 28 MMOL/L
CREAT SERPL-MCNC: 1 MG/DL
EST. GFR  (AFRICAN AMERICAN): >60 ML/MIN/1.73 M^2
EST. GFR  (NON AFRICAN AMERICAN): >60 ML/MIN/1.73 M^2
GLUCOSE SERPL-MCNC: 104 MG/DL
HDLC SERPL-MCNC: 47 MG/DL
HDLC SERPL: 35.6 %
LDLC SERPL CALC-MCNC: 73.4 MG/DL
NONHDLC SERPL-MCNC: 85 MG/DL
POTASSIUM SERPL-SCNC: 4.4 MMOL/L
PROT SERPL-MCNC: 6.9 G/DL
SODIUM SERPL-SCNC: 138 MMOL/L
TRIGL SERPL-MCNC: 58 MG/DL

## 2018-10-30 PROCEDURE — 80053 COMPREHEN METABOLIC PANEL: CPT

## 2018-10-30 PROCEDURE — 36415 COLL VENOUS BLD VENIPUNCTURE: CPT | Mod: PO

## 2018-10-30 PROCEDURE — 80061 LIPID PANEL: CPT

## 2018-11-06 ENCOUNTER — OFFICE VISIT (OUTPATIENT)
Dept: FAMILY MEDICINE | Facility: CLINIC | Age: 63
End: 2018-11-06
Payer: COMMERCIAL

## 2018-11-06 VITALS
TEMPERATURE: 99 F | SYSTOLIC BLOOD PRESSURE: 124 MMHG | DIASTOLIC BLOOD PRESSURE: 70 MMHG | HEART RATE: 78 BPM | HEIGHT: 67 IN | BODY MASS INDEX: 32.36 KG/M2 | WEIGHT: 206.19 LBS | RESPIRATION RATE: 18 BRPM

## 2018-11-06 DIAGNOSIS — E78.5 HYPERLIPIDEMIA, UNSPECIFIED HYPERLIPIDEMIA TYPE: ICD-10-CM

## 2018-11-06 DIAGNOSIS — R73.03 PREDIABETES: ICD-10-CM

## 2018-11-06 DIAGNOSIS — Z12.5 PROSTATE CANCER SCREENING: ICD-10-CM

## 2018-11-06 DIAGNOSIS — J47.9 BRONCHIECTASIS WITHOUT COMPLICATION: ICD-10-CM

## 2018-11-06 DIAGNOSIS — I10 HYPERTENSION, ESSENTIAL: Primary | ICD-10-CM

## 2018-11-06 PROCEDURE — 3008F BODY MASS INDEX DOCD: CPT | Mod: CPTII,S$GLB,, | Performed by: FAMILY MEDICINE

## 2018-11-06 PROCEDURE — 99213 OFFICE O/P EST LOW 20 MIN: CPT | Mod: 25,S$GLB,, | Performed by: FAMILY MEDICINE

## 2018-11-06 PROCEDURE — 3078F DIAST BP <80 MM HG: CPT | Mod: CPTII,S$GLB,, | Performed by: FAMILY MEDICINE

## 2018-11-06 PROCEDURE — 3074F SYST BP LT 130 MM HG: CPT | Mod: CPTII,S$GLB,, | Performed by: FAMILY MEDICINE

## 2018-11-06 PROCEDURE — 90732 PPSV23 VACC 2 YRS+ SUBQ/IM: CPT | Mod: S$GLB,,, | Performed by: FAMILY MEDICINE

## 2018-11-06 PROCEDURE — 90686 IIV4 VACC NO PRSV 0.5 ML IM: CPT | Mod: S$GLB,,, | Performed by: FAMILY MEDICINE

## 2018-11-06 PROCEDURE — 90471 IMMUNIZATION ADMIN: CPT | Mod: S$GLB,,, | Performed by: FAMILY MEDICINE

## 2018-11-06 PROCEDURE — 90472 IMMUNIZATION ADMIN EACH ADD: CPT | Mod: S$GLB,,, | Performed by: FAMILY MEDICINE

## 2018-11-06 NOTE — PROGRESS NOTES
Subjective:       Patient ID: Jermaine Henderson is a 63 y.o. male.    Chief Complaint: Hypertension (Follow with labs prior: Flu shot today)    HPI   The patient is a 63-year-old who is here today for follow-up.  Overall, he is doing well.  Today we discussed the followin)  Hypertension.  He is doing well with the Cozaar (after having to change from the Diovan due to the med recall).  His blood pressures at home have been in the 120s over 60s.  He has not felt as if his blood pressures have been high or low  2) hyperlipidemia.  He is doing well with the Crestor.  His recent total cholesterol was 132 (down from 173) and his LDL was 73 (down from 123).  He has had no side effects with the Crestor and is willing to continue with this    Overall, he is trying to watch his weight and is trying to eat healthier and maintain his weight loss    Review of Systems   Constitutional: Negative for appetite change, chills, diaphoresis, fatigue, fever and unexpected weight change.   HENT: Negative for congestion, ear pain, postnasal drip, rhinorrhea, sinus pressure, sneezing, sore throat and trouble swallowing.    Eyes: Negative for pain, discharge and visual disturbance.   Respiratory: Negative for cough, chest tightness, shortness of breath and wheezing.    Cardiovascular: Negative for chest pain, palpitations and leg swelling.   Gastrointestinal: Negative for abdominal distention, abdominal pain, blood in stool, constipation, diarrhea, nausea and vomiting.   Skin: Negative for rash.       Objective:      Physical Exam   Constitutional: He is oriented to person, place, and time. He appears well-developed and well-nourished. No distress.   HENT:   Head: Normocephalic and atraumatic.   Right Ear: Hearing, tympanic membrane, external ear and ear canal normal.   Left Ear: Hearing, tympanic membrane, external ear and ear canal normal.   Nose: Nose normal.   Mouth/Throat: Oropharynx is clear and moist and mucous membranes are  "normal. No oral lesions. No oropharyngeal exudate, posterior oropharyngeal edema or posterior oropharyngeal erythema.   Eyes: Conjunctivae, EOM and lids are normal. Pupils are equal, round, and reactive to light. No scleral icterus.   Neck: Normal range of motion. Neck supple. Carotid bruit is not present. No thyroid mass and no thyromegaly present.   Cardiovascular: Normal rate, regular rhythm and normal heart sounds.  No extrasystoles are present. PMI is not displaced. Exam reveals no gallop.   No murmur heard.  Pulmonary/Chest: Effort normal and breath sounds normal. No accessory muscle usage. No respiratory distress.   Clear to auscultation bilaterally.   Abdominal: Soft. Normal appearance and bowel sounds are normal. He exhibits no abdominal bruit. There is no hepatosplenomegaly. There is no tenderness. There is no rebound.   Lymphadenopathy:        Head (right side): No submental and no submandibular adenopathy present.        Head (left side): No submental and no submandibular adenopathy present.        Right cervical: No superficial cervical, no deep cervical and no posterior cervical adenopathy present.       Left cervical: No superficial cervical, no deep cervical and no posterior cervical adenopathy present.        Right: No supraclavicular adenopathy present.        Left: No supraclavicular adenopathy present.   Neurological: He is alert and oriented to person, place, and time.   Skin: Skin is warm, dry and intact.   Psychiatric: He has a normal mood and affect.     Blood pressure 124/70, pulse 78, temperature 98.5 °F (36.9 °C), temperature source Oral, resp. rate 18, height 5' 7" (1.702 m), weight 93.5 kg (206 lb 3.2 oz).Body mass index is 32.3 kg/m².          A/P:  1) hypertension.  Well controlled.  Continue with Hyzaar.  If his blood pressures are consistently higher than 135/85, he will let me know  2) hyperlipidemia.  Well controlled.  Continue with Crestor.  We will recheck labs again in 6-12 " months or sooner if needed     3) pre diabetes.  We will check an A1c with next set of labs  4) health maintenance issues.  We will check a PSA with his next set of labs        As long as he does well, I will see him back in 6 months or sooner if needed

## 2018-12-21 RX ORDER — LOSARTAN POTASSIUM 50 MG/1
50 TABLET ORAL DAILY
Qty: 90 TABLET | Refills: 1 | Status: SHIPPED | OUTPATIENT
Start: 2018-12-21 | End: 2019-06-21 | Stop reason: SDUPTHER

## 2018-12-21 NOTE — TELEPHONE ENCOUNTER
----- Message from Bandar Richards sent at 12/21/2018  2:30 PM CST -----  Contact: Patient  Type:  RX Refill Request    Who Called:  patient  Refill or New Rx:  refill  RX Name and Strength:  Blood pressure medication  How is the patient currently taking it? (ex. 1XDay):    Is this a 30 day or 90 day RX:    Preferred Pharmacy with phone number:  [facility]  Local or Mail Order:  local  Ordering Provider:  Dr.Schiro Miramontes Call Back Number:  [phone]  Additional Information:  Requesting a call back when script has been sent

## 2018-12-31 RX ORDER — ROSUVASTATIN CALCIUM 10 MG/1
TABLET, COATED ORAL
Qty: 36 TABLET | Refills: 0 | Status: SHIPPED | OUTPATIENT
Start: 2018-12-31 | End: 2019-03-28 | Stop reason: SDUPTHER

## 2019-03-26 ENCOUNTER — TELEPHONE (OUTPATIENT)
Dept: FAMILY MEDICINE | Facility: CLINIC | Age: 64
End: 2019-03-26

## 2019-03-26 NOTE — TELEPHONE ENCOUNTER
----- Message from Dillon Schmidt sent at 3/22/2019  1:53 PM CDT -----  Contact: Patient  Type: Needs Medical Advice    Who Called:  Patient  Pharmacy name and phone #:    [id number] - [place], [place] - [address] [facility]  [address] [facility] [address]  Phone: [phone] Fax: [phone]  Best Call Back Number: [phone]  Additional Information: Patient would like to discuss authorizations for medications. Patient does not know the name to medications but states one is for BP and the other is for their cholesterol. Please call to verify. Thanks!

## 2019-03-29 RX ORDER — ROSUVASTATIN CALCIUM 10 MG/1
TABLET, COATED ORAL
Qty: 90 TABLET | Refills: 1 | Status: SHIPPED | OUTPATIENT
Start: 2019-03-29 | End: 2019-10-16 | Stop reason: SDUPTHER

## 2019-03-29 RX ORDER — ROSUVASTATIN CALCIUM 10 MG/1
TABLET, COATED ORAL
Qty: 36 TABLET | Refills: 2 | Status: SHIPPED | OUTPATIENT
Start: 2019-03-29 | End: 2019-06-17 | Stop reason: SDUPTHER

## 2019-03-29 NOTE — TELEPHONE ENCOUNTER
----- Message from Chio Sheehan sent at 3/29/2019  8:16 AM CDT -----  Contact: self  Patient [phone] is requesting a refill on his Crestor 10mg - take one tablet 3 times weekly/      [facility] [address] [place] [address] [address] [facility]  [address] [facility] [address]  Phone: [phone] Fax: [phone]

## 2019-03-29 NOTE — TELEPHONE ENCOUNTER
Spoke to patient, informed patient that refill request was received. Patient is also requesting a fill for 90 days instead of 30 days.

## 2019-04-22 RX ORDER — ACYCLOVIR 400 MG/1
400 TABLET ORAL 2 TIMES DAILY
Qty: 180 TABLET | Refills: 0 | Status: SHIPPED | OUTPATIENT
Start: 2019-04-22 | End: 2019-10-16 | Stop reason: SDUPTHER

## 2019-04-22 NOTE — TELEPHONE ENCOUNTER
----- Message from Melissa Holloway sent at 4/22/2019  9:56 AM CDT -----  Contact: pt [phone]  Patient called for a refill on his acyclovirr 400 mg to be called in [facility]

## 2019-06-17 NOTE — TELEPHONE ENCOUNTER
----- Message from Denise Murphy sent at 6/17/2019  2:36 PM CDT -----  Contact: Jermaine  Type:  RX Refill Request    Who Called:  patient  Refill or New Rx:  refill  RX Name and Strength:  rosuvastatin (CRESTOR) 10 MG tablet  How is the patient currently taking it? (ex. 1XDay):  As directed  Is this a 30 day or 90 day RX:  30  Preferred Pharmacy with phone number:    [facility] [address] [place] [address] [address] [place] [address]  Phone: [phone] Fax: [phone]    Local or Mail Order:  local  Ordering Provider:  Jazlyn Miramontes Call Back Number:  [phone]  Additional Information:  Please advise--thank you

## 2019-06-18 RX ORDER — ROSUVASTATIN CALCIUM 10 MG/1
TABLET, COATED ORAL
Qty: 36 TABLET | Refills: 2 | Status: SHIPPED | OUTPATIENT
Start: 2019-06-18 | End: 2019-10-09 | Stop reason: SDUPTHER

## 2019-06-23 RX ORDER — LOSARTAN POTASSIUM 50 MG/1
TABLET ORAL
Qty: 90 TABLET | Refills: 0 | Status: SHIPPED | OUTPATIENT
Start: 2019-06-23 | End: 2019-09-17 | Stop reason: SDUPTHER

## 2019-09-19 RX ORDER — LOSARTAN POTASSIUM 50 MG/1
TABLET ORAL
Qty: 90 TABLET | Refills: 0 | Status: SHIPPED | OUTPATIENT
Start: 2019-09-19 | End: 2019-10-24 | Stop reason: SDUPTHER

## 2019-09-25 ENCOUNTER — PATIENT OUTREACH (OUTPATIENT)
Dept: ADMINISTRATIVE | Facility: HOSPITAL | Age: 64
End: 2019-09-25

## 2019-10-03 ENCOUNTER — TELEPHONE (OUTPATIENT)
Dept: ADMINISTRATIVE | Facility: HOSPITAL | Age: 64
End: 2019-10-03

## 2019-10-09 ENCOUNTER — OFFICE VISIT (OUTPATIENT)
Dept: FAMILY MEDICINE | Facility: CLINIC | Age: 64
End: 2019-10-09
Payer: COMMERCIAL

## 2019-10-09 VITALS
RESPIRATION RATE: 16 BRPM | WEIGHT: 210.19 LBS | DIASTOLIC BLOOD PRESSURE: 78 MMHG | HEIGHT: 67 IN | OXYGEN SATURATION: 95 % | BODY MASS INDEX: 32.99 KG/M2 | HEART RATE: 75 BPM | TEMPERATURE: 99 F | SYSTOLIC BLOOD PRESSURE: 112 MMHG

## 2019-10-09 DIAGNOSIS — R73.03 PREDIABETES: ICD-10-CM

## 2019-10-09 DIAGNOSIS — I10 HYPERTENSION, ESSENTIAL: Primary | ICD-10-CM

## 2019-10-09 DIAGNOSIS — Z12.5 PROSTATE CANCER SCREENING: ICD-10-CM

## 2019-10-09 DIAGNOSIS — J47.9 BRONCHIECTASIS WITHOUT COMPLICATION: ICD-10-CM

## 2019-10-09 DIAGNOSIS — E78.5 HYPERLIPIDEMIA, UNSPECIFIED HYPERLIPIDEMIA TYPE: ICD-10-CM

## 2019-10-09 LAB
BASOPHILS # BLD AUTO: 0.05 K/UL (ref 0–0.2)
BASOPHILS NFR BLD: 0.7 % (ref 0–1.9)
COMPLEXED PSA SERPL-MCNC: 1.1 NG/ML (ref 0–4)
DIFFERENTIAL METHOD: ABNORMAL
EOSINOPHIL # BLD AUTO: 0.1 K/UL (ref 0–0.5)
EOSINOPHIL NFR BLD: 1.4 % (ref 0–8)
ERYTHROCYTE [DISTWIDTH] IN BLOOD BY AUTOMATED COUNT: 12.4 % (ref 11.5–14.5)
ESTIMATED AVG GLUCOSE: 111 MG/DL (ref 68–131)
HBA1C MFR BLD HPLC: 5.5 % (ref 4–5.6)
HCT VFR BLD AUTO: 44.7 % (ref 40–54)
HGB BLD-MCNC: 14.5 G/DL (ref 14–18)
IMM GRANULOCYTES # BLD AUTO: 0.04 K/UL (ref 0–0.04)
IMM GRANULOCYTES NFR BLD AUTO: 0.6 % (ref 0–0.5)
LYMPHOCYTES # BLD AUTO: 1.8 K/UL (ref 1–4.8)
LYMPHOCYTES NFR BLD: 25.8 % (ref 18–48)
MCH RBC QN AUTO: 30.9 PG (ref 27–31)
MCHC RBC AUTO-ENTMCNC: 32.4 G/DL (ref 32–36)
MCV RBC AUTO: 95 FL (ref 82–98)
MONOCYTES # BLD AUTO: 0.5 K/UL (ref 0.3–1)
MONOCYTES NFR BLD: 6.6 % (ref 4–15)
NEUTROPHILS # BLD AUTO: 4.5 K/UL (ref 1.8–7.7)
NEUTROPHILS NFR BLD: 64.9 % (ref 38–73)
NRBC BLD-RTO: 0 /100 WBC
PLATELET # BLD AUTO: 254 K/UL (ref 150–350)
PMV BLD AUTO: 9.6 FL (ref 9.2–12.9)
RBC # BLD AUTO: 4.7 M/UL (ref 4.6–6.2)
WBC # BLD AUTO: 6.97 K/UL (ref 3.9–12.7)

## 2019-10-09 PROCEDURE — 3078F DIAST BP <80 MM HG: CPT | Mod: CPTII,S$GLB,, | Performed by: FAMILY MEDICINE

## 2019-10-09 PROCEDURE — 3074F PR MOST RECENT SYSTOLIC BLOOD PRESSURE < 130 MM HG: ICD-10-PCS | Mod: CPTII,S$GLB,, | Performed by: FAMILY MEDICINE

## 2019-10-09 PROCEDURE — 80053 COMPREHEN METABOLIC PANEL: CPT

## 2019-10-09 PROCEDURE — 80061 LIPID PANEL: CPT

## 2019-10-09 PROCEDURE — 36415 COLL VENOUS BLD VENIPUNCTURE: CPT | Mod: S$GLB,,, | Performed by: FAMILY MEDICINE

## 2019-10-09 PROCEDURE — 84153 ASSAY OF PSA TOTAL: CPT

## 2019-10-09 PROCEDURE — 3008F BODY MASS INDEX DOCD: CPT | Mod: CPTII,S$GLB,, | Performed by: FAMILY MEDICINE

## 2019-10-09 PROCEDURE — 3008F PR BODY MASS INDEX (BMI) DOCUMENTED: ICD-10-PCS | Mod: CPTII,S$GLB,, | Performed by: FAMILY MEDICINE

## 2019-10-09 PROCEDURE — 99214 PR OFFICE/OUTPT VISIT, EST, LEVL IV, 30-39 MIN: ICD-10-PCS | Mod: S$GLB,,, | Performed by: FAMILY MEDICINE

## 2019-10-09 PROCEDURE — 3078F PR MOST RECENT DIASTOLIC BLOOD PRESSURE < 80 MM HG: ICD-10-PCS | Mod: CPTII,S$GLB,, | Performed by: FAMILY MEDICINE

## 2019-10-09 PROCEDURE — 36415 PR COLLECTION VENOUS BLOOD,VENIPUNCTURE: ICD-10-PCS | Mod: S$GLB,,, | Performed by: FAMILY MEDICINE

## 2019-10-09 PROCEDURE — 99214 OFFICE O/P EST MOD 30 MIN: CPT | Mod: S$GLB,,, | Performed by: FAMILY MEDICINE

## 2019-10-09 PROCEDURE — 83036 HEMOGLOBIN GLYCOSYLATED A1C: CPT

## 2019-10-09 PROCEDURE — 85025 COMPLETE CBC W/AUTO DIFF WBC: CPT

## 2019-10-09 PROCEDURE — 3074F SYST BP LT 130 MM HG: CPT | Mod: CPTII,S$GLB,, | Performed by: FAMILY MEDICINE

## 2019-10-09 NOTE — PROGRESS NOTES
Venipuncture performed with 21 gauge butterfly, x's 1 attempt,  to R Antecubital vein.  Specimens collected per orders.      Pressure dressing applied to site, instructed patient to remove dressing in 10-15 minutes, OK to re-adjust dressing if pressure causing any discomfort, to observe closely for numbness and/or discoloration to hand or fingers, and to notify provider if bleeding persists after applying constant pressure lasting 30 minutes.

## 2019-10-10 LAB
ALBUMIN SERPL BCP-MCNC: 4.5 G/DL (ref 3.5–5.2)
ALP SERPL-CCNC: 69 U/L (ref 55–135)
ALT SERPL W/O P-5'-P-CCNC: 27 U/L (ref 10–44)
ANION GAP SERPL CALC-SCNC: 9 MMOL/L (ref 8–16)
AST SERPL-CCNC: 25 U/L (ref 10–40)
BILIRUB SERPL-MCNC: 1.7 MG/DL (ref 0.1–1)
BUN SERPL-MCNC: 16 MG/DL (ref 8–23)
CALCIUM SERPL-MCNC: 9.7 MG/DL (ref 8.7–10.5)
CHLORIDE SERPL-SCNC: 103 MMOL/L (ref 95–110)
CHOLEST SERPL-MCNC: 144 MG/DL (ref 120–199)
CHOLEST/HDLC SERPL: 3.8 {RATIO} (ref 2–5)
CO2 SERPL-SCNC: 25 MMOL/L (ref 23–29)
CREAT SERPL-MCNC: 0.9 MG/DL (ref 0.5–1.4)
EST. GFR  (AFRICAN AMERICAN): >60 ML/MIN/1.73 M^2
EST. GFR  (NON AFRICAN AMERICAN): >60 ML/MIN/1.73 M^2
GLUCOSE SERPL-MCNC: 88 MG/DL (ref 70–110)
HDLC SERPL-MCNC: 38 MG/DL (ref 40–75)
HDLC SERPL: 26.4 % (ref 20–50)
LDLC SERPL CALC-MCNC: 87.4 MG/DL (ref 63–159)
NONHDLC SERPL-MCNC: 106 MG/DL
POTASSIUM SERPL-SCNC: 4.1 MMOL/L (ref 3.5–5.1)
PROT SERPL-MCNC: 7.5 G/DL (ref 6–8.4)
SODIUM SERPL-SCNC: 137 MMOL/L (ref 136–145)
TRIGL SERPL-MCNC: 93 MG/DL (ref 30–150)

## 2019-10-11 ENCOUNTER — PATIENT MESSAGE (OUTPATIENT)
Dept: FAMILY MEDICINE | Facility: CLINIC | Age: 64
End: 2019-10-11

## 2019-10-16 RX ORDER — ROSUVASTATIN CALCIUM 10 MG/1
TABLET, COATED ORAL
Qty: 36 TABLET | Refills: 2 | Status: SHIPPED | OUTPATIENT
Start: 2019-10-16 | End: 2019-11-13 | Stop reason: SDUPTHER

## 2019-10-16 RX ORDER — ACYCLOVIR 400 MG/1
400 TABLET ORAL 2 TIMES DAILY
Qty: 180 TABLET | Refills: 2 | Status: SHIPPED | OUTPATIENT
Start: 2019-10-16 | End: 2019-12-20 | Stop reason: SDUPTHER

## 2019-10-16 NOTE — TELEPHONE ENCOUNTER
----- Message from Raiza Gottlieb sent at 10/16/2019  1:28 PM CDT -----  Contact: Patient  Type: Needs Medical Advice    Who Called:  patient  Best Call Back Number: [phone] (home)   Additional Information: Patient needs a 3 month supply for the Rx Acyclovir and rosuvastatin (CRESTOR) 10 MG per his insurance It needs to be three months supply If any questions please call and advise. Patient said send to [facility] as usual.

## 2019-10-16 NOTE — PROGRESS NOTES
Subjective:       Patient ID: Jermaine Henderson is a 64 y.o. male.    Chief Complaint: Follow-up    HPI   The patient is a 64-year-old who is here today for annual follow-up.  He has been feeling quite well.  He is getting over a recent cold which has been present for the past 3 weeks.  Every time he gets a cold that goes into his chest it takes longer for him to recover but he is finally recovering now.  He does have a history of bronchiectasis and does follow with his pulmonologist    Today we discussed the followin)  Hypertension.  His blood pressure is good today.  He is taking Cozaar 50 mg which he is tolerating well   2)  Hyperlipidemia.  He is doing well with the Crestor.  He is due for fasting labs and is willing to have that done today  3) pre diabetes.  His denies any polyuria, polydipsia or polyphagia.  4) history of HSV.  He does continue with acyclovir daily for suppression.  He has not had a recent outbreak    Review of Systems   Constitutional: Negative for appetite change, chills, diaphoresis, fatigue, fever and unexpected weight change.   HENT: Negative for congestion, ear pain, postnasal drip, rhinorrhea, sinus pressure, sneezing, sore throat and trouble swallowing.    Eyes: Negative for pain, discharge and visual disturbance.   Respiratory: Negative for cough, chest tightness, shortness of breath and wheezing.    Cardiovascular: Negative for chest pain, palpitations and leg swelling.   Gastrointestinal: Negative for abdominal distention, abdominal pain, blood in stool, constipation, diarrhea, nausea and vomiting.   Skin: Negative for rash.       Objective:      Physical Exam   Constitutional: He is oriented to person, place, and time. He appears well-developed and well-nourished. No distress.   HENT:   Head: Normocephalic and atraumatic.   Right Ear: Hearing, tympanic membrane, external ear and ear canal normal.   Left Ear: Hearing, tympanic membrane, external ear and ear canal normal.  "  Nose: Nose normal.   Mouth/Throat: Oropharynx is clear and moist and mucous membranes are normal. No oral lesions. No oropharyngeal exudate, posterior oropharyngeal edema or posterior oropharyngeal erythema.   Eyes: Pupils are equal, round, and reactive to light. Conjunctivae, EOM and lids are normal. No scleral icterus.   Neck: Normal range of motion. Neck supple. Carotid bruit is not present. No thyroid mass and no thyromegaly present.   Cardiovascular: Normal rate, regular rhythm and normal heart sounds.  No extrasystoles are present. PMI is not displaced. Exam reveals no gallop.   No murmur heard.  Pulmonary/Chest: Effort normal and breath sounds normal. No accessory muscle usage. No respiratory distress.   Clear to auscultation bilaterally.   Abdominal: Soft. Normal appearance and bowel sounds are normal. He exhibits no abdominal bruit. There is no hepatosplenomegaly. There is no tenderness. There is no rebound.   Lymphadenopathy:        Head (right side): No submental and no submandibular adenopathy present.        Head (left side): No submental and no submandibular adenopathy present.        Right cervical: No superficial cervical, no deep cervical and no posterior cervical adenopathy present.       Left cervical: No superficial cervical, no deep cervical and no posterior cervical adenopathy present.        Right: No supraclavicular adenopathy present.        Left: No supraclavicular adenopathy present.   Neurological: He is alert and oriented to person, place, and time.   Skin: Skin is warm, dry and intact.   Psychiatric: He has a normal mood and affect.     Blood pressure 112/78, pulse 75, temperature 98.8 °F (37.1 °C), temperature source Oral, resp. rate 16, height 5' 7" (1.702 m), weight 95.3 kg (210 lb 3.2 oz), SpO2 95 %.Body mass index is 32.92 kg/m².          A/P:  1) hypertension.  Well controlled.  Continue Cozaar  2)   Hyperlipidemia.  Well controlled.  Continue with Crestor .  We will check his " fasting lipid profile  today  3)  Pre diabetes.  Improved.  We will check his A1c today  4)  History of HSV.  Asymptomatic.  Continue with acyclovir  5)   bronchiectasis.  Follow up with pulmonology as planned  6)  Health maintenance issues.  He will get shingles shot at the pharmacy.  He will have fasting labs today including a PSA    As long as he does well, I will see him back in 9-12 months or sooner if needed

## 2019-10-24 RX ORDER — LOSARTAN POTASSIUM 50 MG/1
TABLET ORAL
Qty: 90 TABLET | Refills: 0 | Status: SHIPPED | OUTPATIENT
Start: 2019-10-24 | End: 2019-12-17 | Stop reason: SDUPTHER

## 2019-10-24 NOTE — TELEPHONE ENCOUNTER
----- Message from Fam Freitas sent at 10/24/2019  9:00 AM CDT -----  Type:  RX Refill Request    Who Called:  Patient  Refill or New Rx:  Refill  RX Name and Strength:  losartan (COZAAR) 50 MG tablet       How is the patient currently taking it? (ex. 1XDay):  1XDay  Is this a 30 day or 90 day RX:  90  Preferred Pharmacy with phone number:    [facility]    [phone]      Local or Mail Order:  Mail Order  Ordering Provider:Jazlyn Miramontes Call Back Number:  [phone]  Additional Information:

## 2019-11-13 NOTE — TELEPHONE ENCOUNTER
----- Message from Raiza Gottlieb sent at 11/13/2019 10:07 AM CST -----  Contact: Patient  Type: Needs Medical Advice    Who Called: patient  Best Call Back Number: [phone] (home)   Additional Information: Rx for Rosuvastatin 10 mg. Said he needs it refilled with [facility] today.

## 2019-11-14 RX ORDER — ROSUVASTATIN CALCIUM 10 MG/1
TABLET, COATED ORAL
Qty: 36 TABLET | Refills: 3 | Status: SHIPPED | OUTPATIENT
Start: 2019-11-14 | End: 2020-08-14

## 2019-11-18 ENCOUNTER — TELEPHONE (OUTPATIENT)
Dept: FAMILY MEDICINE | Facility: CLINIC | Age: 64
End: 2019-11-18

## 2019-11-18 NOTE — TELEPHONE ENCOUNTER
Patient notified prescription was sent to mail order pharmacy on 11/14/19, verbalized understanding.

## 2019-11-18 NOTE — TELEPHONE ENCOUNTER
----- Message from Nayeli Andrade sent at 11/18/2019  2:22 PM CST -----  Contact: [phone]/ self   Patient requesting to speak with you regarding getting a refill on his medication rosuvastatin (CRESTOR) 10 MG tablet. Please call

## 2019-12-17 RX ORDER — LOSARTAN POTASSIUM 50 MG/1
TABLET ORAL
Qty: 90 TABLET | Refills: 1 | Status: SHIPPED | OUTPATIENT
Start: 2019-12-17 | End: 2020-06-01

## 2019-12-20 ENCOUNTER — TELEPHONE (OUTPATIENT)
Dept: FAMILY MEDICINE | Facility: CLINIC | Age: 64
End: 2019-12-20

## 2019-12-20 RX ORDER — ACYCLOVIR 400 MG/1
400 TABLET ORAL 2 TIMES DAILY
Qty: 180 TABLET | Refills: 2 | Status: SHIPPED | OUTPATIENT
Start: 2019-12-20 | End: 2020-09-02

## 2019-12-20 NOTE — TELEPHONE ENCOUNTER
----- Message from Don Jarrett sent at 12/20/2019 12:31 PM CST -----  Contact: pt  Type:  RX Refill Request    Who Called:  pt  Refill or New Rx:  refill  RX Name and Strength:  acyclovir (ZOVIRAX) 400 MG tablet  How is the patient currently taking it? (ex. 1XDay):  2 x day  Is this a 30 day or 90 day RX:  90  Preferred Pharmacy with phone number:      [facility] - [address]  [address]  [facility] [address]  Phone: [phone] Fax: [phone]    Local or Mail Order:  mail  Ordering Provider:    Best Call Back Number:  [phone]  Additional Information:  Pt has 5 days left

## 2020-05-05 ENCOUNTER — PATIENT MESSAGE (OUTPATIENT)
Dept: ADMINISTRATIVE | Facility: HOSPITAL | Age: 65
End: 2020-05-05

## 2020-06-01 RX ORDER — LOSARTAN POTASSIUM 50 MG/1
TABLET ORAL
Qty: 90 TABLET | Refills: 0 | Status: SHIPPED | OUTPATIENT
Start: 2020-06-01 | End: 2020-07-28

## 2020-07-28 RX ORDER — LOSARTAN POTASSIUM 50 MG/1
TABLET ORAL
Qty: 90 TABLET | Refills: 0 | Status: SHIPPED | OUTPATIENT
Start: 2020-07-28 | End: 2020-09-02 | Stop reason: SDUPTHER

## 2020-07-28 NOTE — TELEPHONE ENCOUNTER
Called and spoke with pt. Pt was notified of medication refill and is scheduled for follow up appt on 8/12/2020. Pt voiced understanding.

## 2020-09-02 ENCOUNTER — OFFICE VISIT (OUTPATIENT)
Dept: FAMILY MEDICINE | Facility: CLINIC | Age: 65
End: 2020-09-02
Payer: COMMERCIAL

## 2020-09-02 VITALS
OXYGEN SATURATION: 95 % | DIASTOLIC BLOOD PRESSURE: 80 MMHG | SYSTOLIC BLOOD PRESSURE: 122 MMHG | BODY MASS INDEX: 34.22 KG/M2 | HEART RATE: 76 BPM | TEMPERATURE: 99 F | HEIGHT: 67 IN | WEIGHT: 218.06 LBS

## 2020-09-02 DIAGNOSIS — E78.5 HYPERLIPIDEMIA, UNSPECIFIED HYPERLIPIDEMIA TYPE: ICD-10-CM

## 2020-09-02 DIAGNOSIS — N52.9 ERECTILE DYSFUNCTION, UNSPECIFIED ERECTILE DYSFUNCTION TYPE: ICD-10-CM

## 2020-09-02 DIAGNOSIS — Z00.00 ANNUAL PHYSICAL EXAM: Primary | ICD-10-CM

## 2020-09-02 DIAGNOSIS — I10 HYPERTENSION, ESSENTIAL: ICD-10-CM

## 2020-09-02 DIAGNOSIS — Z12.5 PROSTATE CANCER SCREENING: ICD-10-CM

## 2020-09-02 DIAGNOSIS — R73.03 PREDIABETES: ICD-10-CM

## 2020-09-02 LAB
ALBUMIN SERPL BCP-MCNC: 4.2 G/DL (ref 3.5–5.2)
ALP SERPL-CCNC: 66 U/L (ref 55–135)
ALT SERPL W/O P-5'-P-CCNC: 30 U/L (ref 10–44)
ANION GAP SERPL CALC-SCNC: 9 MMOL/L (ref 8–16)
AST SERPL-CCNC: 31 U/L (ref 10–40)
BASOPHILS # BLD AUTO: 0.04 K/UL (ref 0–0.2)
BASOPHILS NFR BLD: 0.6 % (ref 0–1.9)
BILIRUB SERPL-MCNC: 1.8 MG/DL (ref 0.1–1)
BUN SERPL-MCNC: 15 MG/DL (ref 8–23)
CALCIUM SERPL-MCNC: 8.8 MG/DL (ref 8.7–10.5)
CHLORIDE SERPL-SCNC: 107 MMOL/L (ref 95–110)
CHOLEST SERPL-MCNC: 149 MG/DL (ref 120–199)
CHOLEST/HDLC SERPL: 3.5 {RATIO} (ref 2–5)
CO2 SERPL-SCNC: 23 MMOL/L (ref 23–29)
CREAT SERPL-MCNC: 1 MG/DL (ref 0.5–1.4)
DIFFERENTIAL METHOD: NORMAL
EOSINOPHIL # BLD AUTO: 0.1 K/UL (ref 0–0.5)
EOSINOPHIL NFR BLD: 1.6 % (ref 0–8)
ERYTHROCYTE [DISTWIDTH] IN BLOOD BY AUTOMATED COUNT: 13.1 % (ref 11.5–14.5)
EST. GFR  (AFRICAN AMERICAN): >60 ML/MIN/1.73 M^2
EST. GFR  (NON AFRICAN AMERICAN): >60 ML/MIN/1.73 M^2
GLUCOSE SERPL-MCNC: 92 MG/DL (ref 70–110)
HCT VFR BLD AUTO: 43.9 % (ref 40–54)
HDLC SERPL-MCNC: 42 MG/DL (ref 40–75)
HDLC SERPL: 28.2 % (ref 20–50)
HGB BLD-MCNC: 14.5 G/DL (ref 14–18)
IMM GRANULOCYTES # BLD AUTO: 0.03 K/UL (ref 0–0.04)
IMM GRANULOCYTES NFR BLD AUTO: 0.5 % (ref 0–0.5)
LDLC SERPL CALC-MCNC: 86 MG/DL (ref 63–159)
LYMPHOCYTES # BLD AUTO: 1.8 K/UL (ref 1–4.8)
LYMPHOCYTES NFR BLD: 27.9 % (ref 18–48)
MCH RBC QN AUTO: 31 PG (ref 27–31)
MCHC RBC AUTO-ENTMCNC: 33 G/DL (ref 32–36)
MCV RBC AUTO: 94 FL (ref 82–98)
MONOCYTES # BLD AUTO: 0.5 K/UL (ref 0.3–1)
MONOCYTES NFR BLD: 7.9 % (ref 4–15)
NEUTROPHILS # BLD AUTO: 4 K/UL (ref 1.8–7.7)
NEUTROPHILS NFR BLD: 61.5 % (ref 38–73)
NONHDLC SERPL-MCNC: 107 MG/DL
NRBC BLD-RTO: 0 /100 WBC
PLATELET # BLD AUTO: 225 K/UL (ref 150–350)
PMV BLD AUTO: 9.9 FL (ref 9.2–12.9)
POTASSIUM SERPL-SCNC: 4.2 MMOL/L (ref 3.5–5.1)
PROT SERPL-MCNC: 7.1 G/DL (ref 6–8.4)
RBC # BLD AUTO: 4.68 M/UL (ref 4.6–6.2)
SODIUM SERPL-SCNC: 139 MMOL/L (ref 136–145)
TRIGL SERPL-MCNC: 105 MG/DL (ref 30–150)
TSH SERPL DL<=0.005 MIU/L-ACNC: 1.59 UIU/ML (ref 0.4–4)
WBC # BLD AUTO: 6.45 K/UL (ref 3.9–12.7)

## 2020-09-02 PROCEDURE — 3074F PR MOST RECENT SYSTOLIC BLOOD PRESSURE < 130 MM HG: ICD-10-PCS | Mod: CPTII,S$GLB,, | Performed by: FAMILY MEDICINE

## 2020-09-02 PROCEDURE — 36415 COLL VENOUS BLD VENIPUNCTURE: CPT | Mod: S$GLB,,, | Performed by: FAMILY MEDICINE

## 2020-09-02 PROCEDURE — 99396 PREV VISIT EST AGE 40-64: CPT | Mod: 25,S$GLB,, | Performed by: FAMILY MEDICINE

## 2020-09-02 PROCEDURE — 90471 FLU VACCINE (QUAD) GREATER THAN OR EQUAL TO 3YO PRESERVATIVE FREE IM: ICD-10-PCS | Mod: S$GLB,,, | Performed by: FAMILY MEDICINE

## 2020-09-02 PROCEDURE — 84153 ASSAY OF PSA TOTAL: CPT

## 2020-09-02 PROCEDURE — 3079F DIAST BP 80-89 MM HG: CPT | Mod: CPTII,S$GLB,, | Performed by: FAMILY MEDICINE

## 2020-09-02 PROCEDURE — 84443 ASSAY THYROID STIM HORMONE: CPT

## 2020-09-02 PROCEDURE — 83036 HEMOGLOBIN GLYCOSYLATED A1C: CPT

## 2020-09-02 PROCEDURE — 80061 LIPID PANEL: CPT

## 2020-09-02 PROCEDURE — 99396 PR PREVENTIVE VISIT,EST,40-64: ICD-10-PCS | Mod: 25,S$GLB,, | Performed by: FAMILY MEDICINE

## 2020-09-02 PROCEDURE — 36415 PR COLLECTION VENOUS BLOOD,VENIPUNCTURE: ICD-10-PCS | Mod: S$GLB,,, | Performed by: FAMILY MEDICINE

## 2020-09-02 PROCEDURE — 3074F SYST BP LT 130 MM HG: CPT | Mod: CPTII,S$GLB,, | Performed by: FAMILY MEDICINE

## 2020-09-02 PROCEDURE — 3079F PR MOST RECENT DIASTOLIC BLOOD PRESSURE 80-89 MM HG: ICD-10-PCS | Mod: CPTII,S$GLB,, | Performed by: FAMILY MEDICINE

## 2020-09-02 PROCEDURE — 90686 FLU VACCINE (QUAD) GREATER THAN OR EQUAL TO 3YO PRESERVATIVE FREE IM: ICD-10-PCS | Mod: S$GLB,,, | Performed by: FAMILY MEDICINE

## 2020-09-02 PROCEDURE — 90471 IMMUNIZATION ADMIN: CPT | Mod: S$GLB,,, | Performed by: FAMILY MEDICINE

## 2020-09-02 PROCEDURE — 3008F BODY MASS INDEX DOCD: CPT | Mod: CPTII,S$GLB,, | Performed by: FAMILY MEDICINE

## 2020-09-02 PROCEDURE — 90686 IIV4 VACC NO PRSV 0.5 ML IM: CPT | Mod: S$GLB,,, | Performed by: FAMILY MEDICINE

## 2020-09-02 PROCEDURE — 3008F PR BODY MASS INDEX (BMI) DOCUMENTED: ICD-10-PCS | Mod: CPTII,S$GLB,, | Performed by: FAMILY MEDICINE

## 2020-09-02 PROCEDURE — 80053 COMPREHEN METABOLIC PANEL: CPT

## 2020-09-02 PROCEDURE — 85025 COMPLETE CBC W/AUTO DIFF WBC: CPT

## 2020-09-02 RX ORDER — LOSARTAN POTASSIUM 50 MG/1
TABLET ORAL
Qty: 90 TABLET | Refills: 3 | Status: SHIPPED | OUTPATIENT
Start: 2020-09-02 | End: 2021-09-18

## 2020-09-02 RX ORDER — ROSUVASTATIN CALCIUM 10 MG/1
TABLET, COATED ORAL
Qty: 36 TABLET | Refills: 1 | Status: SHIPPED | OUTPATIENT
Start: 2020-09-02 | End: 2021-01-02

## 2020-09-02 RX ORDER — SILDENAFIL 100 MG/1
100 TABLET, FILM COATED ORAL DAILY PRN
Qty: 10 TABLET | Refills: 2 | Status: SHIPPED | OUTPATIENT
Start: 2020-09-02 | End: 2023-01-05 | Stop reason: SDUPTHER

## 2020-09-02 NOTE — PROGRESS NOTES
"Subjective:       Patient ID: Jermaine Henderson is a 65 y.o. male.    Chief Complaint: Hypertension (follow up on BP )    HPI   The patient is a 65-year-old who is here today for his chronic follow-up.  Since I have seen him last, he has gained weight.  He has "plumped up" and been "packing on a gut" during COVID.  During COVID, he has not been going to the gym.  Initially during COVID, he was drinking more alcohol but in the past month and a half he has cut back on his alcohol intake.  Regarding his drinking, does occasionally binge drink on the weekends and occasionally blacks out.   He is going to try to work on losing weight.  He is fasting today for labs.    Today we discussed the followin)  Hypertension.  Today his blood pressure is 122/80.  He is doing well with his Cozaar   2)  Hyperlipidemia.  He is doing well with the Crestor.  He is fasting today for labs    Review of Systems   Constitutional: Positive for unexpected weight change (weight gain). Negative for appetite change, chills, diaphoresis, fatigue and fever.   HENT: Negative for congestion, ear pain, postnasal drip, rhinorrhea, sinus pressure, sneezing, sore throat and trouble swallowing.    Eyes: Negative for pain, discharge and visual disturbance.   Respiratory: Negative for cough, chest tightness, shortness of breath and wheezing.    Cardiovascular: Negative for chest pain, palpitations and leg swelling.   Gastrointestinal: Negative for abdominal distention, abdominal pain, blood in stool, constipation, diarrhea, nausea and vomiting.   Skin: Negative for rash.       Objective:      Physical Exam  Constitutional:       General: He is not in acute distress.     Appearance: Normal appearance. He is well-developed.   HENT:      Head: Normocephalic and atraumatic.      Right Ear: Hearing, tympanic membrane, ear canal and external ear normal.      Left Ear: Hearing, tympanic membrane, ear canal and external ear normal.      Nose: Nose normal.    " "  Mouth/Throat:      Mouth: No oral lesions.      Pharynx: No oropharyngeal exudate or posterior oropharyngeal erythema.   Eyes:      General: Lids are normal. No scleral icterus.     Extraocular Movements: Extraocular movements intact.      Conjunctiva/sclera: Conjunctivae normal.      Pupils: Pupils are equal, round, and reactive to light.   Neck:      Musculoskeletal: Normal range of motion and neck supple.      Thyroid: No thyroid mass or thyromegaly.      Vascular: No carotid bruit.   Cardiovascular:      Rate and Rhythm: Normal rate and regular rhythm.  No extrasystoles are present.     Chest Wall: PMI is not displaced.      Heart sounds: Normal heart sounds. No murmur. No gallop.    Pulmonary:      Effort: Pulmonary effort is normal. No accessory muscle usage or respiratory distress.      Breath sounds: Normal breath sounds.   Abdominal:      General: Bowel sounds are normal. There is no abdominal bruit.      Palpations: Abdomen is soft.      Tenderness: There is no abdominal tenderness. There is no rebound.   Lymphadenopathy:      Head:      Right side of head: No submental or submandibular adenopathy.      Left side of head: No submental or submandibular adenopathy.      Cervical:      Right cervical: No superficial, deep or posterior cervical adenopathy.     Left cervical: No superficial, deep or posterior cervical adenopathy.      Upper Body:      Right upper body: No supraclavicular adenopathy.      Left upper body: No supraclavicular adenopathy.   Skin:     General: Skin is warm and dry.   Neurological:      Mental Status: He is alert and oriented to person, place, and time.      Cranial Nerves: No cranial nerve deficit.      Sensory: No sensory deficit.   Psychiatric:         Speech: Speech normal.         Behavior: Behavior normal.         Thought Content: Thought content normal.       Blood pressure 122/80, pulse 76, temperature 98.8 °F (37.1 °C), temperature source Temporal, height 5' 7" (1.702 m), " weight 98.9 kg (218 lb 0.6 oz), SpO2 95 %.Body mass index is 34.15 kg/m².          A/P:  1) hypertension.  Well controlled.  Continue with Cozaar.  He will work on diet exercise weight loss  2) hyperlipidemia.  Status unknown.  He will continue with Crestor.  We will check fasting labs today  3) obesity with BMI of 34.  He is going to work on diet exercise and weight loss.  He is also going to decrease his alcohol intake.  4)  Pre diabetes.  Status unknown.  We will check an A1c today  5)  ED.  I did refill his viagra  6)  Annual exam.  Health maintenance issues and anticipatory guidance issues were discussed. He is going to decrease his alcohol intake.  He is due for colonoscopy and we will reach out to the GI team to schedule this.  We will check fasting labs today.  He will receive his flu shot today

## 2020-09-03 LAB
COMPLEXED PSA SERPL-MCNC: 1.1 NG/ML (ref 0–4)
ESTIMATED AVG GLUCOSE: 123 MG/DL (ref 68–131)
HBA1C MFR BLD HPLC: 5.9 % (ref 4–5.6)

## 2020-09-04 ENCOUNTER — TELEPHONE (OUTPATIENT)
Dept: FAMILY MEDICINE | Facility: CLINIC | Age: 65
End: 2020-09-04

## 2020-09-04 NOTE — TELEPHONE ENCOUNTER
Pls notify pt:    Labs look good except for prediabetes which is a bit higher than prior values.  Do work on diet exercise and weight loss to help this

## 2020-09-14 ENCOUNTER — TELEPHONE (OUTPATIENT)
Dept: GASTROENTEROLOGY | Facility: CLINIC | Age: 65
End: 2020-09-14

## 2020-09-14 DIAGNOSIS — Z01.812 PRE-PROCEDURE LAB EXAM: ICD-10-CM

## 2020-09-14 NOTE — TELEPHONE ENCOUNTER
----- Message from Chrystal Hobson MD sent at 9/2/2020  8:15 AM CDT -----  Pt appears due for repeat cscope.Can you contact him to schedule?Thanks!

## 2020-11-16 ENCOUNTER — TELEPHONE (OUTPATIENT)
Dept: GASTROENTEROLOGY | Facility: CLINIC | Age: 65
End: 2020-11-16

## 2020-11-16 NOTE — TELEPHONE ENCOUNTER
----- Message from Rosemary Contreras sent at 11/16/2020  4:44 PM CST -----  Regarding: Pt Message  Type: Needs Medical Advice  Who Called: PT  Best Call Back Number: [phone]  Additional Information: Patient is requesting a call back in reference to, cancel and r/s please and Procedure. Please and thank you

## 2020-11-17 NOTE — TELEPHONE ENCOUNTER
----- Message from Payton Hahn sent at 11/17/2020  2:48 PM CST -----  Regarding: reschedule procedure  Contact: Patient  Type:  Reschedule Appointment Request      Name of Caller:  Patient  \  Best Call Back Number:  [phone]    Additional Information:     Patient requesting to reschedule colonoscopy on 11/23

## 2020-12-17 NOTE — TELEPHONE ENCOUNTER
----- Message from Jackson Bashir sent at 12/17/2020  9:46 AM CST -----  Regarding: refill  Contact: patient  Type:  RX Refill Request    Who Called:  patient  Refill or New Rx:  new    acyclovir (ZOVIRAX) 400 MG tablet (Discontinued) 180 tablet 2 12/20/2019 9/2/2020 No  Sig - Route: Take 1 tablet (400 mg total) by mouth 2 (two) times daily. - Oral    Preferred Pharmacy with phone number:      [facility] - [address]  [address]  [facility] [address]  Phone: [phone] Fax: [phone]    Ordering Provider:  Jazlyn Miramontes Call Back Number:  [phone]  Additional Information:  n/a

## 2020-12-18 NOTE — TELEPHONE ENCOUNTER
----- Message from Cammy Fu sent at 12/18/2020 10:22 AM CST -----  Regarding: refill  Contact: SANTIAGO SOL [[id number]]  Patient requesting a refill on acyclovir 400 mg.    Patient will be using   [facility] - [address]  [address]  [facility] [address]  Phone: [phone] Fax: [phone]    Please call patient at [phone]. Thanks!

## 2020-12-21 RX ORDER — ACYCLOVIR 400 MG/1
400 TABLET ORAL 2 TIMES DAILY
Qty: 180 TABLET | Refills: 2 | Status: SHIPPED | OUTPATIENT
Start: 2020-12-21 | End: 2023-01-05 | Stop reason: SDUPTHER

## 2021-02-22 ENCOUNTER — TELEPHONE (OUTPATIENT)
Dept: GASTROENTEROLOGY | Facility: CLINIC | Age: 66
End: 2021-02-22

## 2021-02-26 ENCOUNTER — LAB VISIT (OUTPATIENT)
Dept: FAMILY MEDICINE | Facility: CLINIC | Age: 66
End: 2021-02-26
Payer: COMMERCIAL

## 2021-02-26 DIAGNOSIS — Z01.812 PRE-PROCEDURE LAB EXAM: ICD-10-CM

## 2021-02-26 PROCEDURE — U0005 INFEC AGEN DETEC AMPLI PROBE: HCPCS

## 2021-02-26 PROCEDURE — U0003 INFECTIOUS AGENT DETECTION BY NUCLEIC ACID (DNA OR RNA); SEVERE ACUTE RESPIRATORY SYNDROME CORONAVIRUS 2 (SARS-COV-2) (CORONAVIRUS DISEASE [COVID-19]), AMPLIFIED PROBE TECHNIQUE, MAKING USE OF HIGH THROUGHPUT TECHNOLOGIES AS DESCRIBED BY CMS-2020-01-R: HCPCS

## 2021-02-27 LAB — SARS-COV-2 RNA RESP QL NAA+PROBE: NOT DETECTED

## 2021-03-01 ENCOUNTER — ANESTHESIA (OUTPATIENT)
Dept: ENDOSCOPY | Facility: HOSPITAL | Age: 66
End: 2021-03-01
Payer: COMMERCIAL

## 2021-03-01 ENCOUNTER — ANESTHESIA EVENT (OUTPATIENT)
Dept: ENDOSCOPY | Facility: HOSPITAL | Age: 66
End: 2021-03-01
Payer: COMMERCIAL

## 2021-03-01 ENCOUNTER — HOSPITAL ENCOUNTER (OUTPATIENT)
Facility: HOSPITAL | Age: 66
Discharge: HOME | End: 2021-03-01
Attending: INTERNAL MEDICINE | Admitting: INTERNAL MEDICINE
Payer: COMMERCIAL

## 2021-03-01 DIAGNOSIS — Z86.010 HX OF COLONIC POLYPS: ICD-10-CM

## 2021-03-01 PROBLEM — Z86.0100 HX OF COLONIC POLYPS: Status: ACTIVE | Noted: 2021-03-01

## 2021-03-01 PROCEDURE — 63600175 PHARM REV CODE 636 W HCPCS: Mod: PO | Performed by: INTERNAL MEDICINE

## 2021-03-01 PROCEDURE — 37000009 HC ANESTHESIA EA ADD 15 MINS: Mod: PO | Performed by: INTERNAL MEDICINE

## 2021-03-01 PROCEDURE — 63600175 PHARM REV CODE 636 W HCPCS: Mod: PO | Performed by: NURSE ANESTHETIST, CERTIFIED REGISTERED

## 2021-03-01 PROCEDURE — D9220A PRA ANESTHESIA: ICD-10-PCS | Mod: 33,,, | Performed by: ANESTHESIOLOGY

## 2021-03-01 PROCEDURE — 25000003 PHARM REV CODE 250: Mod: PO | Performed by: NURSE ANESTHETIST, CERTIFIED REGISTERED

## 2021-03-01 PROCEDURE — 45385 PR COLONOSCOPY,REMV LESN,SNARE: ICD-10-PCS | Mod: 33,,, | Performed by: INTERNAL MEDICINE

## 2021-03-01 PROCEDURE — 27201089 HC SNARE, DISP (ANY): Mod: PO | Performed by: INTERNAL MEDICINE

## 2021-03-01 PROCEDURE — 88305 TISSUE EXAM BY PATHOLOGIST: ICD-10-PCS | Mod: 26,,, | Performed by: PATHOLOGY

## 2021-03-01 PROCEDURE — 37000008 HC ANESTHESIA 1ST 15 MINUTES: Mod: PO | Performed by: INTERNAL MEDICINE

## 2021-03-01 PROCEDURE — 45385 COLONOSCOPY W/LESION REMOVAL: CPT | Mod: 33,,, | Performed by: INTERNAL MEDICINE

## 2021-03-01 PROCEDURE — 45385 COLONOSCOPY W/LESION REMOVAL: CPT | Mod: PO | Performed by: INTERNAL MEDICINE

## 2021-03-01 PROCEDURE — D9220A PRA ANESTHESIA: Mod: 33,,, | Performed by: ANESTHESIOLOGY

## 2021-03-01 PROCEDURE — 88305 TISSUE EXAM BY PATHOLOGIST: CPT | Performed by: PATHOLOGY

## 2021-03-01 PROCEDURE — 88305 TISSUE EXAM BY PATHOLOGIST: CPT | Mod: 26,,, | Performed by: PATHOLOGY

## 2021-03-01 RX ORDER — SODIUM CHLORIDE 0.9 % (FLUSH) 0.9 %
10 SYRINGE (ML) INJECTION
Status: DISCONTINUED | OUTPATIENT
Start: 2021-03-01 | End: 2021-03-01 | Stop reason: HOSPADM

## 2021-03-01 RX ORDER — LIDOCAINE HCL/PF 100 MG/5ML
SYRINGE (ML) INTRAVENOUS
Status: DISCONTINUED | OUTPATIENT
Start: 2021-03-01 | End: 2021-03-01

## 2021-03-01 RX ORDER — PROPOFOL 10 MG/ML
VIAL (ML) INTRAVENOUS CONTINUOUS PRN
Status: DISCONTINUED | OUTPATIENT
Start: 2021-03-01 | End: 2021-03-01

## 2021-03-01 RX ORDER — SODIUM CHLORIDE, SODIUM LACTATE, POTASSIUM CHLORIDE, CALCIUM CHLORIDE 600; 310; 30; 20 MG/100ML; MG/100ML; MG/100ML; MG/100ML
INJECTION, SOLUTION INTRAVENOUS CONTINUOUS
Status: DISCONTINUED | OUTPATIENT
Start: 2021-03-01 | End: 2021-03-01 | Stop reason: HOSPADM

## 2021-03-01 RX ORDER — PROPOFOL 10 MG/ML
VIAL (ML) INTRAVENOUS
Status: DISCONTINUED | OUTPATIENT
Start: 2021-03-01 | End: 2021-03-01

## 2021-03-01 RX ADMIN — LIDOCAINE HYDROCHLORIDE 100 MG: 20 INJECTION, SOLUTION INTRAVENOUS at 08:03

## 2021-03-01 RX ADMIN — PROPOFOL 150 MCG/KG/MIN: 10 INJECTION, EMULSION INTRAVENOUS at 08:03

## 2021-03-01 RX ADMIN — SODIUM CHLORIDE, SODIUM LACTATE, POTASSIUM CHLORIDE, AND CALCIUM CHLORIDE: .6; .31; .03; .02 INJECTION, SOLUTION INTRAVENOUS at 07:03

## 2021-03-01 RX ADMIN — PROPOFOL 100 MG: 10 INJECTION, EMULSION INTRAVENOUS at 08:03

## 2021-03-02 VITALS
TEMPERATURE: 98 F | OXYGEN SATURATION: 98 % | WEIGHT: 205 LBS | RESPIRATION RATE: 16 BRPM | SYSTOLIC BLOOD PRESSURE: 121 MMHG | DIASTOLIC BLOOD PRESSURE: 78 MMHG | BODY MASS INDEX: 32.18 KG/M2 | HEIGHT: 67 IN | HEART RATE: 70 BPM

## 2021-03-08 LAB
FINAL PATHOLOGIC DIAGNOSIS: NORMAL
Lab: NORMAL

## 2021-06-02 ENCOUNTER — TELEPHONE (OUTPATIENT)
Dept: FAMILY MEDICINE | Facility: CLINIC | Age: 66
End: 2021-06-02

## 2021-06-02 DIAGNOSIS — Z20.822 ENCOUNTER FOR LABORATORY TESTING FOR COVID-19 VIRUS: ICD-10-CM

## 2021-06-07 ENCOUNTER — TELEPHONE (OUTPATIENT)
Dept: FAMILY MEDICINE | Facility: CLINIC | Age: 66
End: 2021-06-07

## 2021-06-16 ENCOUNTER — CLINICAL SUPPORT (OUTPATIENT)
Dept: URGENT CARE | Facility: CLINIC | Age: 66
End: 2021-06-16
Payer: COMMERCIAL

## 2021-06-16 DIAGNOSIS — Z20.822 CONTACT WITH AND (SUSPECTED) EXPOSURE TO COVID-19: ICD-10-CM

## 2021-06-16 LAB — SARS-COV-2 RNA RESP QL NAA+PROBE: NOT DETECTED

## 2021-06-16 PROCEDURE — U0005 INFEC AGEN DETEC AMPLI PROBE: HCPCS | Performed by: FAMILY MEDICINE

## 2021-06-16 PROCEDURE — 99211 PR OFFICE/OUTPT VISIT, EST, LEVL I: ICD-10-PCS | Mod: S$GLB,,, | Performed by: FAMILY MEDICINE

## 2021-06-16 PROCEDURE — U0003 INFECTIOUS AGENT DETECTION BY NUCLEIC ACID (DNA OR RNA); SEVERE ACUTE RESPIRATORY SYNDROME CORONAVIRUS 2 (SARS-COV-2) (CORONAVIRUS DISEASE [COVID-19]), AMPLIFIED PROBE TECHNIQUE, MAKING USE OF HIGH THROUGHPUT TECHNOLOGIES AS DESCRIBED BY CMS-2020-01-R: HCPCS | Performed by: FAMILY MEDICINE

## 2021-06-16 PROCEDURE — 99211 OFF/OP EST MAY X REQ PHY/QHP: CPT | Mod: S$GLB,,, | Performed by: FAMILY MEDICINE

## 2021-06-17 ENCOUNTER — TELEPHONE (OUTPATIENT)
Dept: URGENT CARE | Facility: CLINIC | Age: 66
End: 2021-06-17

## 2021-08-16 ENCOUNTER — TELEPHONE (OUTPATIENT)
Dept: FAMILY MEDICINE | Facility: CLINIC | Age: 66
End: 2021-08-16

## 2021-08-16 DIAGNOSIS — U07.1 COVID-19: Primary | ICD-10-CM

## 2021-08-23 ENCOUNTER — TELEPHONE (OUTPATIENT)
Dept: FAMILY MEDICINE | Facility: CLINIC | Age: 66
End: 2021-08-23

## 2021-08-25 ENCOUNTER — TELEPHONE (OUTPATIENT)
Dept: FAMILY MEDICINE | Facility: CLINIC | Age: 66
End: 2021-08-25

## 2021-08-25 ENCOUNTER — PATIENT MESSAGE (OUTPATIENT)
Dept: FAMILY MEDICINE | Facility: CLINIC | Age: 66
End: 2021-08-25

## 2021-09-18 RX ORDER — LOSARTAN POTASSIUM 50 MG/1
TABLET ORAL
Qty: 90 TABLET | Refills: 0 | Status: SHIPPED | OUTPATIENT
Start: 2021-09-18 | End: 2021-11-28

## 2021-10-19 ENCOUNTER — OFFICE VISIT (OUTPATIENT)
Dept: FAMILY MEDICINE | Facility: CLINIC | Age: 66
End: 2021-10-19
Payer: COMMERCIAL

## 2021-10-19 VITALS
OXYGEN SATURATION: 98 % | SYSTOLIC BLOOD PRESSURE: 120 MMHG | RESPIRATION RATE: 18 BRPM | BODY MASS INDEX: 29.52 KG/M2 | TEMPERATURE: 98 F | WEIGHT: 188.5 LBS | HEART RATE: 74 BPM | DIASTOLIC BLOOD PRESSURE: 68 MMHG

## 2021-10-19 DIAGNOSIS — Z00.00 ANNUAL PHYSICAL EXAM: Primary | ICD-10-CM

## 2021-10-19 DIAGNOSIS — E78.5 HYPERLIPIDEMIA, UNSPECIFIED HYPERLIPIDEMIA TYPE: ICD-10-CM

## 2021-10-19 DIAGNOSIS — I10 HYPERTENSION, ESSENTIAL: ICD-10-CM

## 2021-10-19 LAB
25(OH)D3+25(OH)D2 SERPL-MCNC: 19 NG/ML (ref 30–96)
ALBUMIN SERPL BCP-MCNC: 4.2 G/DL (ref 3.5–5.2)
ALP SERPL-CCNC: 59 U/L (ref 55–135)
ALT SERPL W/O P-5'-P-CCNC: 18 U/L (ref 10–44)
ANION GAP SERPL CALC-SCNC: 12 MMOL/L (ref 8–16)
AST SERPL-CCNC: 26 U/L (ref 10–40)
BASOPHILS # BLD AUTO: 0.04 K/UL (ref 0–0.2)
BASOPHILS NFR BLD: 0.7 % (ref 0–1.9)
BILIRUB SERPL-MCNC: 2 MG/DL (ref 0.1–1)
BUN SERPL-MCNC: 9 MG/DL (ref 8–23)
CALCIUM SERPL-MCNC: 9.6 MG/DL (ref 8.7–10.5)
CHLORIDE SERPL-SCNC: 106 MMOL/L (ref 95–110)
CHOLEST SERPL-MCNC: 153 MG/DL (ref 120–199)
CHOLEST/HDLC SERPL: 2.6 {RATIO} (ref 2–5)
CO2 SERPL-SCNC: 22 MMOL/L (ref 23–29)
COMPLEXED PSA SERPL-MCNC: 1.1 NG/ML (ref 0–4)
CREAT SERPL-MCNC: 0.8 MG/DL (ref 0.5–1.4)
DIFFERENTIAL METHOD: ABNORMAL
EOSINOPHIL # BLD AUTO: 0.1 K/UL (ref 0–0.5)
EOSINOPHIL NFR BLD: 2.1 % (ref 0–8)
ERYTHROCYTE [DISTWIDTH] IN BLOOD BY AUTOMATED COUNT: 13.2 % (ref 11.5–14.5)
EST. GFR  (AFRICAN AMERICAN): >60 ML/MIN/1.73 M^2
EST. GFR  (NON AFRICAN AMERICAN): >60 ML/MIN/1.73 M^2
ESTIMATED AVG GLUCOSE: 108 MG/DL (ref 68–131)
GLUCOSE SERPL-MCNC: 87 MG/DL (ref 70–110)
HBA1C MFR BLD: 5.4 % (ref 4–5.6)
HCT VFR BLD AUTO: 40 % (ref 40–54)
HDLC SERPL-MCNC: 60 MG/DL (ref 40–75)
HDLC SERPL: 39.2 % (ref 20–50)
HGB BLD-MCNC: 13.6 G/DL (ref 14–18)
IMM GRANULOCYTES # BLD AUTO: 0.02 K/UL (ref 0–0.04)
IMM GRANULOCYTES NFR BLD AUTO: 0.4 % (ref 0–0.5)
LDLC SERPL CALC-MCNC: 76.6 MG/DL (ref 63–159)
LYMPHOCYTES # BLD AUTO: 1.5 K/UL (ref 1–4.8)
LYMPHOCYTES NFR BLD: 26.4 % (ref 18–48)
MCH RBC QN AUTO: 31.6 PG (ref 27–31)
MCHC RBC AUTO-ENTMCNC: 34 G/DL (ref 32–36)
MCV RBC AUTO: 93 FL (ref 82–98)
MONOCYTES # BLD AUTO: 0.4 K/UL (ref 0.3–1)
MONOCYTES NFR BLD: 7 % (ref 4–15)
NEUTROPHILS # BLD AUTO: 3.6 K/UL (ref 1.8–7.7)
NEUTROPHILS NFR BLD: 63.4 % (ref 38–73)
NONHDLC SERPL-MCNC: 93 MG/DL
NRBC BLD-RTO: 0 /100 WBC
PLATELET # BLD AUTO: 224 K/UL (ref 150–450)
PMV BLD AUTO: 10 FL (ref 9.2–12.9)
POTASSIUM SERPL-SCNC: 3.9 MMOL/L (ref 3.5–5.1)
PROT SERPL-MCNC: 7.2 G/DL (ref 6–8.4)
RBC # BLD AUTO: 4.31 M/UL (ref 4.6–6.2)
SODIUM SERPL-SCNC: 140 MMOL/L (ref 136–145)
TRIGL SERPL-MCNC: 82 MG/DL (ref 30–150)
TSH SERPL DL<=0.005 MIU/L-ACNC: 1.52 UIU/ML (ref 0.4–4)
WBC # BLD AUTO: 5.69 K/UL (ref 3.9–12.7)

## 2021-10-19 PROCEDURE — 90472 TDAP VACCINE GREATER THAN OR EQUAL TO 7YO IM: ICD-10-PCS | Mod: S$GLB,,, | Performed by: FAMILY MEDICINE

## 2021-10-19 PROCEDURE — 1159F MED LIST DOCD IN RCRD: CPT | Mod: CPTII,S$GLB,, | Performed by: FAMILY MEDICINE

## 2021-10-19 PROCEDURE — 4010F PR ACE/ARB THEARPY RXD/TAKEN: ICD-10-PCS | Mod: CPTII,S$GLB,, | Performed by: FAMILY MEDICINE

## 2021-10-19 PROCEDURE — 90686 IIV4 VACC NO PRSV 0.5 ML IM: CPT | Mod: S$GLB,,, | Performed by: FAMILY MEDICINE

## 2021-10-19 PROCEDURE — 4010F ACE/ARB THERAPY RXD/TAKEN: CPT | Mod: CPTII,S$GLB,, | Performed by: FAMILY MEDICINE

## 2021-10-19 PROCEDURE — 90715 TDAP VACCINE GREATER THAN OR EQUAL TO 7YO IM: ICD-10-PCS | Mod: S$GLB,,, | Performed by: FAMILY MEDICINE

## 2021-10-19 PROCEDURE — 3074F SYST BP LT 130 MM HG: CPT | Mod: CPTII,S$GLB,, | Performed by: FAMILY MEDICINE

## 2021-10-19 PROCEDURE — 3078F DIAST BP <80 MM HG: CPT | Mod: CPTII,S$GLB,, | Performed by: FAMILY MEDICINE

## 2021-10-19 PROCEDURE — 1160F RVW MEDS BY RX/DR IN RCRD: CPT | Mod: CPTII,S$GLB,, | Performed by: FAMILY MEDICINE

## 2021-10-19 PROCEDURE — 1159F PR MEDICATION LIST DOCUMENTED IN MEDICAL RECORD: ICD-10-PCS | Mod: CPTII,S$GLB,, | Performed by: FAMILY MEDICINE

## 2021-10-19 PROCEDURE — 82306 VITAMIN D 25 HYDROXY: CPT | Performed by: FAMILY MEDICINE

## 2021-10-19 PROCEDURE — 36415 COLL VENOUS BLD VENIPUNCTURE: CPT | Mod: S$GLB,,, | Performed by: FAMILY MEDICINE

## 2021-10-19 PROCEDURE — 90471 IMMUNIZATION ADMIN: CPT | Mod: S$GLB,,, | Performed by: FAMILY MEDICINE

## 2021-10-19 PROCEDURE — 3078F PR MOST RECENT DIASTOLIC BLOOD PRESSURE < 80 MM HG: ICD-10-PCS | Mod: CPTII,S$GLB,, | Performed by: FAMILY MEDICINE

## 2021-10-19 PROCEDURE — 84443 ASSAY THYROID STIM HORMONE: CPT | Performed by: FAMILY MEDICINE

## 2021-10-19 PROCEDURE — 3044F PR MOST RECENT HEMOGLOBIN A1C LEVEL <7.0%: ICD-10-PCS | Mod: CPTII,S$GLB,, | Performed by: FAMILY MEDICINE

## 2021-10-19 PROCEDURE — 80061 LIPID PANEL: CPT | Performed by: FAMILY MEDICINE

## 2021-10-19 PROCEDURE — 90686 FLU VACCINE (QUAD) GREATER THAN OR EQUAL TO 3YO PRESERVATIVE FREE IM: ICD-10-PCS | Mod: S$GLB,,, | Performed by: FAMILY MEDICINE

## 2021-10-19 PROCEDURE — 3008F BODY MASS INDEX DOCD: CPT | Mod: CPTII,S$GLB,, | Performed by: FAMILY MEDICINE

## 2021-10-19 PROCEDURE — 90715 TDAP VACCINE 7 YRS/> IM: CPT | Mod: S$GLB,,, | Performed by: FAMILY MEDICINE

## 2021-10-19 PROCEDURE — 99396 PREV VISIT EST AGE 40-64: CPT | Mod: 25,S$GLB,, | Performed by: FAMILY MEDICINE

## 2021-10-19 PROCEDURE — 3074F PR MOST RECENT SYSTOLIC BLOOD PRESSURE < 130 MM HG: ICD-10-PCS | Mod: CPTII,S$GLB,, | Performed by: FAMILY MEDICINE

## 2021-10-19 PROCEDURE — 3044F HG A1C LEVEL LT 7.0%: CPT | Mod: CPTII,S$GLB,, | Performed by: FAMILY MEDICINE

## 2021-10-19 PROCEDURE — 83036 HEMOGLOBIN GLYCOSYLATED A1C: CPT | Performed by: FAMILY MEDICINE

## 2021-10-19 PROCEDURE — 80053 COMPREHEN METABOLIC PANEL: CPT | Performed by: FAMILY MEDICINE

## 2021-10-19 PROCEDURE — 3008F PR BODY MASS INDEX (BMI) DOCUMENTED: ICD-10-PCS | Mod: CPTII,S$GLB,, | Performed by: FAMILY MEDICINE

## 2021-10-19 PROCEDURE — 90472 IMMUNIZATION ADMIN EACH ADD: CPT | Mod: S$GLB,,, | Performed by: FAMILY MEDICINE

## 2021-10-19 PROCEDURE — 90471 FLU VACCINE (QUAD) GREATER THAN OR EQUAL TO 3YO PRESERVATIVE FREE IM: ICD-10-PCS | Mod: S$GLB,,, | Performed by: FAMILY MEDICINE

## 2021-10-19 PROCEDURE — 99396 PR PREVENTIVE VISIT,EST,40-64: ICD-10-PCS | Mod: 25,S$GLB,, | Performed by: FAMILY MEDICINE

## 2021-10-19 PROCEDURE — 36415 PR COLLECTION VENOUS BLOOD,VENIPUNCTURE: ICD-10-PCS | Mod: S$GLB,,, | Performed by: FAMILY MEDICINE

## 2021-10-19 PROCEDURE — 84153 ASSAY OF PSA TOTAL: CPT | Performed by: FAMILY MEDICINE

## 2021-10-19 PROCEDURE — 85025 COMPLETE CBC W/AUTO DIFF WBC: CPT | Performed by: FAMILY MEDICINE

## 2021-10-19 PROCEDURE — 1160F PR REVIEW ALL MEDS BY PRESCRIBER/CLIN PHARMACIST DOCUMENTED: ICD-10-PCS | Mod: CPTII,S$GLB,, | Performed by: FAMILY MEDICINE

## 2021-11-06 ENCOUNTER — PATIENT MESSAGE (OUTPATIENT)
Dept: FAMILY MEDICINE | Facility: CLINIC | Age: 66
End: 2021-11-06
Payer: COMMERCIAL

## 2022-10-04 ENCOUNTER — TELEPHONE (OUTPATIENT)
Dept: FAMILY MEDICINE | Facility: CLINIC | Age: 67
End: 2022-10-04
Payer: MEDICARE

## 2022-10-04 DIAGNOSIS — Z00.00 ANNUAL PHYSICAL EXAM: Primary | ICD-10-CM

## 2022-10-04 RX ORDER — ROSUVASTATIN CALCIUM 10 MG/1
TABLET, COATED ORAL
Qty: 36 TABLET | Refills: 0 | Status: SHIPPED | OUTPATIENT
Start: 2022-10-04 | End: 2022-12-16

## 2022-10-04 NOTE — TELEPHONE ENCOUNTER
Refill Decision Note   Jermaine Henderson  is requesting a refill authorization.  Brief Assessment and Rationale for Refill:  Approve    -Medication-Related Problems Identified:   Requires appointment  Requires labs  Medication Therapy Plan:  Due for annual w/ PCP, Labs (CBC, CMP, lipid panel)    Medication Reconciliation Completed: No   Comments:     Provider Staff:     Action is required for this patient.   Please see care gap opportunities below in Care Due Message.     Thanks!  [facility]     Appointments      Date Provider   Last Visit   10/19/2021 Chrystal Hobson MD   Next Visit   Visit date not found Chrystal Hobson MD     Note composed:5:14 PM 10/04/2022           Note composed:5:14 PM 10/04/2022

## 2022-10-04 NOTE — TELEPHONE ENCOUNTER
Care Due:                  Date            Visit Type   Department     Provider  --------------------------------------------------------------------------------                                EP -                              PRIMARY      [facility]    Chrystal Hobson  Last Visit: 10-      CARE ([facility])   MEDICINE       [facility]  Next Visit: None Scheduled  None         None Found                                                            Last  Test          Frequency    Reason                     Performed    Due Date  --------------------------------------------------------------------------------    Office Visit  12 months..  acyclovir, losartan,       10-   10-                             rosuvastatin.............    CBC.........  12 months..  acyclovir................  10-   10-    CMP.........  12 months..  acyclovir, losartan,       10-   10-                             rosuvastatin.............    Lipid Panel.  12 months..  rosuvastatin.............  10-   10-    [facility] Embedded Care Gaps. Reference number: [id number]. 10/04/2022   5:00:30 PM CDT

## 2022-10-05 ENCOUNTER — PATIENT MESSAGE (OUTPATIENT)
Dept: FAMILY MEDICINE | Facility: CLINIC | Age: 67
End: 2022-10-05
Payer: MEDICARE

## 2022-10-17 ENCOUNTER — PATIENT MESSAGE (OUTPATIENT)
Dept: FAMILY MEDICINE | Facility: CLINIC | Age: 67
End: 2022-10-17
Payer: MEDICARE

## 2023-01-04 NOTE — TELEPHONE ENCOUNTER
Refill Routing Note   Medication(s) are not appropriate for processing by [facility] for the following reason(s):      - Required laboratory values are outdated  - Required vitals are abnormal    [facility] action(s):  Defer       Medication Therapy Plan: FOV;  Medication reconciliation completed: No     Appointments  past 12m or future 3m with PCP    Date Provider   Last Visit   10/19/2021 Chrystal Hobson MD   Next Visit   1/5/2023 Chrystal Hobson MD   ED visits in past 90 days: 0        Note composed:10:26 AM 01/04/2023

## 2023-01-04 NOTE — TELEPHONE ENCOUNTER
No new care gaps identified.  [facility] Embedded Care Gaps. Reference number: [id number]. 1/03/2023   9:06:35 PM CST

## 2023-01-05 ENCOUNTER — LAB VISIT (OUTPATIENT)
Dept: LAB | Facility: HOSPITAL | Age: 68
End: 2023-01-05
Attending: FAMILY MEDICINE
Payer: MEDICARE

## 2023-01-05 ENCOUNTER — OFFICE VISIT (OUTPATIENT)
Dept: FAMILY MEDICINE | Facility: CLINIC | Age: 68
End: 2023-01-05
Payer: MEDICARE

## 2023-01-05 VITALS
WEIGHT: 221 LBS | HEIGHT: 67 IN | RESPIRATION RATE: 20 BRPM | DIASTOLIC BLOOD PRESSURE: 78 MMHG | HEART RATE: 82 BPM | OXYGEN SATURATION: 96 % | SYSTOLIC BLOOD PRESSURE: 128 MMHG | TEMPERATURE: 98 F | BODY MASS INDEX: 34.69 KG/M2

## 2023-01-05 DIAGNOSIS — G47.33 OSA (OBSTRUCTIVE SLEEP APNEA): ICD-10-CM

## 2023-01-05 DIAGNOSIS — R73.03 PREDIABETES: ICD-10-CM

## 2023-01-05 DIAGNOSIS — E78.5 HYPERLIPIDEMIA, UNSPECIFIED HYPERLIPIDEMIA TYPE: ICD-10-CM

## 2023-01-05 DIAGNOSIS — N52.9 ERECTILE DYSFUNCTION, UNSPECIFIED ERECTILE DYSFUNCTION TYPE: ICD-10-CM

## 2023-01-05 DIAGNOSIS — I10 HYPERTENSION, ESSENTIAL: Primary | ICD-10-CM

## 2023-01-05 DIAGNOSIS — Z00.00 ANNUAL PHYSICAL EXAM: ICD-10-CM

## 2023-01-05 LAB
25(OH)D3+25(OH)D2 SERPL-MCNC: 10 NG/ML (ref 30–96)
ALBUMIN SERPL BCP-MCNC: 4.3 G/DL (ref 3.5–5.2)
ALP SERPL-CCNC: 68 U/L (ref 55–135)
ALT SERPL W/O P-5'-P-CCNC: 27 U/L (ref 10–44)
ANION GAP SERPL CALC-SCNC: 10 MMOL/L (ref 8–16)
AST SERPL-CCNC: 27 U/L (ref 10–40)
BASOPHILS # BLD AUTO: 0.07 K/UL (ref 0–0.2)
BASOPHILS NFR BLD: 1 % (ref 0–1.9)
BILIRUB SERPL-MCNC: 2.4 MG/DL (ref 0.1–1)
BUN SERPL-MCNC: 16 MG/DL (ref 8–23)
CALCIUM SERPL-MCNC: 9.5 MG/DL (ref 8.7–10.5)
CHLORIDE SERPL-SCNC: 103 MMOL/L (ref 95–110)
CHOLEST SERPL-MCNC: 190 MG/DL (ref 120–199)
CHOLEST/HDLC SERPL: 4.3 {RATIO} (ref 2–5)
CO2 SERPL-SCNC: 26 MMOL/L (ref 23–29)
COMPLEXED PSA SERPL-MCNC: 1 NG/ML (ref 0–4)
CREAT SERPL-MCNC: 1.1 MG/DL (ref 0.5–1.4)
DIFFERENTIAL METHOD: ABNORMAL
EOSINOPHIL # BLD AUTO: 0.1 K/UL (ref 0–0.5)
EOSINOPHIL NFR BLD: 1.8 % (ref 0–8)
ERYTHROCYTE [DISTWIDTH] IN BLOOD BY AUTOMATED COUNT: 12.7 % (ref 11.5–14.5)
EST. GFR  (NO RACE VARIABLE): >60 ML/MIN/1.73 M^2
ESTIMATED AVG GLUCOSE: 117 MG/DL (ref 68–131)
GLUCOSE SERPL-MCNC: 100 MG/DL (ref 70–110)
HBA1C MFR BLD: 5.7 % (ref 4–5.6)
HCT VFR BLD AUTO: 45.3 % (ref 40–54)
HDLC SERPL-MCNC: 44 MG/DL (ref 40–75)
HDLC SERPL: 23.2 % (ref 20–50)
HGB BLD-MCNC: 15.1 G/DL (ref 14–18)
IMM GRANULOCYTES # BLD AUTO: 0.05 K/UL (ref 0–0.04)
IMM GRANULOCYTES NFR BLD AUTO: 0.7 % (ref 0–0.5)
LDLC SERPL CALC-MCNC: 120.6 MG/DL (ref 63–159)
LYMPHOCYTES # BLD AUTO: 2 K/UL (ref 1–4.8)
LYMPHOCYTES NFR BLD: 27.8 % (ref 18–48)
MCH RBC QN AUTO: 31.7 PG (ref 27–31)
MCHC RBC AUTO-ENTMCNC: 33.3 G/DL (ref 32–36)
MCV RBC AUTO: 95 FL (ref 82–98)
MONOCYTES # BLD AUTO: 0.6 K/UL (ref 0.3–1)
MONOCYTES NFR BLD: 7.8 % (ref 4–15)
NEUTROPHILS # BLD AUTO: 4.3 K/UL (ref 1.8–7.7)
NEUTROPHILS NFR BLD: 60.9 % (ref 38–73)
NONHDLC SERPL-MCNC: 146 MG/DL
NRBC BLD-RTO: 0 /100 WBC
PLATELET # BLD AUTO: 237 K/UL (ref 150–450)
PMV BLD AUTO: 10.1 FL (ref 9.2–12.9)
POTASSIUM SERPL-SCNC: 4.4 MMOL/L (ref 3.5–5.1)
PROT SERPL-MCNC: 7.4 G/DL (ref 6–8.4)
RBC # BLD AUTO: 4.77 M/UL (ref 4.6–6.2)
SODIUM SERPL-SCNC: 139 MMOL/L (ref 136–145)
TRIGL SERPL-MCNC: 127 MG/DL (ref 30–150)
TSH SERPL DL<=0.005 MIU/L-ACNC: 1.71 UIU/ML (ref 0.4–4)
WBC # BLD AUTO: 7.09 K/UL (ref 3.9–12.7)

## 2023-01-05 PROCEDURE — 90694 VACC AIIV4 NO PRSRV 0.5ML IM: CPT | Mod: S$GLB,,, | Performed by: FAMILY MEDICINE

## 2023-01-05 PROCEDURE — 80053 COMPREHEN METABOLIC PANEL: CPT | Performed by: FAMILY MEDICINE

## 2023-01-05 PROCEDURE — G0008 FLU VACCINE - QUADRIVALENT - ADJUVANTED: ICD-10-PCS | Mod: S$GLB,,, | Performed by: FAMILY MEDICINE

## 2023-01-05 PROCEDURE — G0008 ADMIN INFLUENZA VIRUS VAC: HCPCS | Mod: S$GLB,,, | Performed by: FAMILY MEDICINE

## 2023-01-05 PROCEDURE — 99214 PR OFFICE/OUTPT VISIT, EST, LEVL IV, 30-39 MIN: ICD-10-PCS | Mod: S$GLB,,, | Performed by: FAMILY MEDICINE

## 2023-01-05 PROCEDURE — 84153 ASSAY OF PSA TOTAL: CPT | Performed by: FAMILY MEDICINE

## 2023-01-05 PROCEDURE — 84443 ASSAY THYROID STIM HORMONE: CPT | Performed by: FAMILY MEDICINE

## 2023-01-05 PROCEDURE — 82306 VITAMIN D 25 HYDROXY: CPT | Performed by: FAMILY MEDICINE

## 2023-01-05 PROCEDURE — 90694 FLU VACCINE - QUADRIVALENT - ADJUVANTED: ICD-10-PCS | Mod: S$GLB,,, | Performed by: FAMILY MEDICINE

## 2023-01-05 PROCEDURE — 99214 OFFICE O/P EST MOD 30 MIN: CPT | Mod: S$GLB,,, | Performed by: FAMILY MEDICINE

## 2023-01-05 PROCEDURE — 80061 LIPID PANEL: CPT | Performed by: FAMILY MEDICINE

## 2023-01-05 PROCEDURE — 85025 COMPLETE CBC W/AUTO DIFF WBC: CPT | Performed by: FAMILY MEDICINE

## 2023-01-05 PROCEDURE — 36415 COLL VENOUS BLD VENIPUNCTURE: CPT | Mod: PO | Performed by: FAMILY MEDICINE

## 2023-01-05 PROCEDURE — 83036 HEMOGLOBIN GLYCOSYLATED A1C: CPT | Performed by: FAMILY MEDICINE

## 2023-01-05 RX ORDER — SILDENAFIL 100 MG/1
100 TABLET, FILM COATED ORAL DAILY PRN
Qty: 10 TABLET | Refills: 2 | Status: SHIPPED | OUTPATIENT
Start: 2023-01-05 | End: 2024-02-29 | Stop reason: SDUPTHER

## 2023-01-05 RX ORDER — LOSARTAN POTASSIUM 50 MG/1
50 TABLET ORAL DAILY
Qty: 90 TABLET | Refills: 3 | Status: SHIPPED | OUTPATIENT
Start: 2023-01-05 | End: 2024-01-11

## 2023-01-05 RX ORDER — ACYCLOVIR 400 MG/1
400 TABLET ORAL 2 TIMES DAILY
Qty: 180 TABLET | Refills: 3 | Status: SHIPPED | OUTPATIENT
Start: 2023-01-05

## 2023-01-05 RX ORDER — ROSUVASTATIN CALCIUM 10 MG/1
10 TABLET, COATED ORAL
Qty: 36 TABLET | Refills: 3 | Status: SHIPPED | OUTPATIENT
Start: 2023-01-05 | End: 2023-01-13 | Stop reason: SDUPTHER

## 2023-01-05 NOTE — PROGRESS NOTES
Subjective:       Patient ID: Jermaine Henderson is a 67 y.o. male.    Chief Complaint: Annual Exam    HPI  The patient is a 67-year-old who is here today for his annual exam.  He has had a busy year and for the past 8 months has stopped taking care of myself.  For the past several months, he and his wife have been working on their house and visiting friends in the hospital.  He is aware of his weight gain (in 2021 he weighed 188 lb and today he weighs 221 lb) and is going to start working on this.  He is paying closer attention to what he is eating and is going to be more physically active.  He is due for fasting labs and is fasting today.  He is willing to have his flu shot today.  We did discuss the Shingrix shot which he will consider in the future    Today we discussed the followin)  Hypertension.  Today's blood pressure is 128/78.  He is taking Cozaar which he is tolerating well   2)  Hyperlipidemia.  He is taking Crestor 3 times a week.  He is due for fasting labs would be willing to have that done today  3)  OCTAVIO.  He is using his CPAP consistently  4) HSV.  He is taking his acyclovir consistently  5)  ED. He would like a refill of his Viagra which works well for him  6) cough.  He coughs once or twice a day for a minute or 2 at a time.  He describes a tickle in the back of his throat.  A family friend had a chronic cough due to their blood pressure medication and he wonders if that might be causing his cough.  Of note, he does have a history of bronchiectasis but denies any shortness of breath or wheezing.    Review of Systems   Constitutional:  Negative for appetite change, chills, diaphoresis, fatigue, fever and unexpected weight change.   HENT:  Negative for congestion, dental problem, ear pain, postnasal drip, rhinorrhea, sinus pressure, sneezing, sore throat and trouble swallowing.    Eyes:  Negative for photophobia, pain, discharge and visual disturbance.   Respiratory:  Positive for  cough. Negative for chest tightness, shortness of breath and wheezing.    Cardiovascular:  Negative for chest pain, palpitations and leg swelling.   Gastrointestinal:  Negative for abdominal distention, abdominal pain, blood in stool, constipation, diarrhea, nausea and vomiting.   Endocrine: Negative for polydipsia and polyuria.   Genitourinary:  Negative for dysuria, flank pain, frequency, genital sores, hematuria, penile discharge and testicular pain.        +ED   Musculoskeletal:  Negative for arthralgias, joint swelling and myalgias.   Skin:  Negative for rash.   Neurological:  Negative for dizziness, syncope, weakness, light-headedness and headaches.   Hematological:  Negative for adenopathy. Does not bruise/bleed easily.   Psychiatric/Behavioral:  Negative for dysphoric mood, self-injury, sleep disturbance and suicidal ideas. The patient is not nervous/anxious.        Objective:      Physical Exam  Constitutional:       General: He is not in acute distress.     Appearance: Normal appearance. He is well-developed.   HENT:      Head: Normocephalic and atraumatic.      Right Ear: Hearing, tympanic membrane, ear canal and external ear normal.      Left Ear: Hearing, tympanic membrane, ear canal and external ear normal.      Nose: Nose normal.      Mouth/Throat:      Mouth: No oral lesions.      Pharynx: No oropharyngeal exudate or posterior oropharyngeal erythema.   Eyes:      General: Lids are normal. No scleral icterus.     Extraocular Movements: Extraocular movements intact.      Conjunctiva/sclera: Conjunctivae normal.      Pupils: Pupils are equal, round, and reactive to light.   Neck:      Thyroid: No thyroid mass or thyromegaly.      Vascular: No carotid bruit.   Cardiovascular:      Rate and Rhythm: Normal rate and regular rhythm. No extrasystoles are present.     Chest Wall: PMI is not displaced.      Heart sounds: Normal heart sounds. No murmur heard.    No gallop.   Pulmonary:      Effort: Pulmonary  "effort is normal. No accessory muscle usage or respiratory distress.      Breath sounds: Normal breath sounds.   Abdominal:      General: Bowel sounds are normal. There is no abdominal bruit.      Palpations: Abdomen is soft.      Tenderness: There is no abdominal tenderness. There is no rebound.   Musculoskeletal:      Cervical back: Normal range of motion and neck supple.   Lymphadenopathy:      Head:      Right side of head: No submental or submandibular adenopathy.      Left side of head: No submental or submandibular adenopathy.      Cervical:      Right cervical: No superficial, deep or posterior cervical adenopathy.     Left cervical: No superficial, deep or posterior cervical adenopathy.      Upper Body:      Right upper body: No supraclavicular adenopathy.      Left upper body: No supraclavicular adenopathy.   Skin:     General: Skin is warm and dry.   Neurological:      Mental Status: He is alert and oriented to person, place, and time.      Cranial Nerves: No cranial nerve deficit.      Sensory: No sensory deficit.   Psychiatric:         Speech: Speech normal.         Behavior: Behavior normal.         Thought Content: Thought content normal.     Blood pressure 128/78, pulse 82, temperature 97.9 °F (36.6 °C), resp. rate 20, height 5' 7" (1.702 m), weight 100.3 kg (221 lb 0.2 oz), SpO2 96 %.Body mass index is 34.62 kg/m².            A/P:  1)  Hypertension.  Well controlled.  Continue with Cozaar   2)  Hyperlipidemia.  Status unknown.  Continue with Crestor.  We will check fasting lipid profile today  3)  OCTAVIO.  Well controlled.  Continue with CPAP  4) HSV.  Well controlled.  Continue with acyclovir   5)  ED. persistent.  I did refill Viagra   6) cough likely due to postnasal drainage.  He is going to take Zyrtec every night.  If the tickle in his throat and or his cough persists, he will let me know  7) bronchiectasis.  Stable and most likely asymptomatic     7) pre diabetes.  Asymptomatic.  We will check " an A1c  8) health maintenance issues.  He will be physically active and consume a healthy diet.  He is going to work on weight loss.  We will administer his flu shot today.  He will consider his Shingrix shot soon.

## 2023-01-07 RX ORDER — LOSARTAN POTASSIUM 50 MG/1
TABLET ORAL
Qty: 90 TABLET | Refills: 0 | OUTPATIENT
Start: 2023-01-07

## 2023-01-09 ENCOUNTER — PATIENT MESSAGE (OUTPATIENT)
Dept: FAMILY MEDICINE | Facility: CLINIC | Age: 68
End: 2023-01-09
Payer: MEDICARE

## 2023-01-09 DIAGNOSIS — E55.9 VITAMIN D DEFICIENCY: Primary | ICD-10-CM

## 2023-01-09 DIAGNOSIS — E78.5 HYPERLIPIDEMIA, UNSPECIFIED HYPERLIPIDEMIA TYPE: ICD-10-CM

## 2023-01-13 ENCOUNTER — TELEPHONE (OUTPATIENT)
Dept: FAMILY MEDICINE | Facility: CLINIC | Age: 68
End: 2023-01-13
Payer: MEDICARE

## 2023-01-13 ENCOUNTER — PATIENT MESSAGE (OUTPATIENT)
Dept: FAMILY MEDICINE | Facility: CLINIC | Age: 68
End: 2023-01-13
Payer: MEDICARE

## 2023-01-13 RX ORDER — ROSUVASTATIN CALCIUM 10 MG/1
10 TABLET, COATED ORAL DAILY
Qty: 90 TABLET | Refills: 1 | Status: SHIPPED | OUTPATIENT
Start: 2023-01-13 | End: 2023-04-18 | Stop reason: SDUPTHER

## 2023-01-13 NOTE — TELEPHONE ENCOUNTER
lab appointment April 4th.  Please check in at the lab anytime after 7:15 fasting.  Also, your acyclovir (ZOVIRAX) medication was sent for refill to your pharmacy on 1/5/23 with 3 refills.

## 2023-01-13 NOTE — TELEPHONE ENCOUNTER
----- Message from Prem Cortez sent at 1/13/2023  9:34 AM CST -----  Type:  RX Refill Request    Who Called:  pt  Refill or New Rx:  refill  RX Name and Strength:  acyclovir (ZOVIRAX) 400 MG tablet  How is the patient currently taking it? (ex. 1XDay):  as directed  Is this a 30 day or 90 day RX:  30  Preferred Pharmacy with phone number:      [facility] #[id number] - [facility] [address] [address]  [address] [place] [address]  Phone: [phone] Fax: [phone]      Local or Mail Order:  local  Ordering Provider:  Chrystal Miramontes Call Back Number:  [phone] (home)     Additional Information:  please call and advise--thank you

## 2023-04-04 ENCOUNTER — LAB VISIT (OUTPATIENT)
Dept: LAB | Facility: HOSPITAL | Age: 68
End: 2023-04-04
Attending: FAMILY MEDICINE
Payer: MEDICARE

## 2023-04-04 DIAGNOSIS — E78.5 HYPERLIPIDEMIA, UNSPECIFIED HYPERLIPIDEMIA TYPE: ICD-10-CM

## 2023-04-04 DIAGNOSIS — E55.9 VITAMIN D DEFICIENCY: ICD-10-CM

## 2023-04-04 LAB
25(OH)D3+25(OH)D2 SERPL-MCNC: 22 NG/ML (ref 30–96)
CHOLEST SERPL-MCNC: 141 MG/DL (ref 120–199)
CHOLEST/HDLC SERPL: 3.2 {RATIO} (ref 2–5)
HDLC SERPL-MCNC: 44 MG/DL (ref 40–75)
HDLC SERPL: 31.2 % (ref 20–50)
LDLC SERPL CALC-MCNC: 82.4 MG/DL (ref 63–159)
NONHDLC SERPL-MCNC: 97 MG/DL
TRIGL SERPL-MCNC: 73 MG/DL (ref 30–150)

## 2023-04-04 PROCEDURE — 80061 LIPID PANEL: CPT | Performed by: FAMILY MEDICINE

## 2023-04-04 PROCEDURE — 82306 VITAMIN D 25 HYDROXY: CPT | Performed by: FAMILY MEDICINE

## 2023-04-04 PROCEDURE — 36415 COLL VENOUS BLD VENIPUNCTURE: CPT | Mod: PO | Performed by: FAMILY MEDICINE

## 2023-04-15 ENCOUNTER — PATIENT MESSAGE (OUTPATIENT)
Dept: FAMILY MEDICINE | Facility: CLINIC | Age: 68
End: 2023-04-15
Payer: MEDICARE

## 2023-04-18 RX ORDER — ROSUVASTATIN CALCIUM 10 MG/1
10 TABLET, COATED ORAL DAILY
Qty: 90 TABLET | Refills: 3 | Status: SHIPPED | OUTPATIENT
Start: 2023-04-18

## 2023-04-18 NOTE — TELEPHONE ENCOUNTER
----- Message from Hari Box sent at 4/18/2023  2:14 PM CDT -----  Contact: Pt  Type: RX Refill Request  Who Called:Pt  Refill or New RX:Refill  RX Name and Strength:rosuvastatin (CRESTOR) 10 MG tablet  How is the patient currently taking it: As Directed  Is this a 30 or 90 day : as Directed  Preferred Pharmacy/Phone Number:    [facility]pharmacy #[id number] - [place] - [address] [address]  [address] [place] [address]  Phone: [phone] Fax: [phone]        Best Call Back Number:[phone]    Additional Information :N/A

## 2023-04-18 NOTE — TELEPHONE ENCOUNTER
No new care gaps identified.  [facility] Embedded Care Gaps. Reference number: [id number]. 4/18/2023   4:02:54 PM CDT

## 2023-04-18 NOTE — TELEPHONE ENCOUNTER
This was sent on 1/13/23 for #90 x 1R. Pt should have refill available. Pt not using [facility] and needs it sent to [facility]. Pharm updated. Rx pended. Please approve. Pt would like call back once done.

## 2024-01-11 DIAGNOSIS — Z12.5 ENCOUNTER FOR SCREENING FOR MALIGNANT NEOPLASM OF PROSTATE: ICD-10-CM

## 2024-01-11 DIAGNOSIS — I10 HYPERTENSION, ESSENTIAL: Primary | ICD-10-CM

## 2024-01-11 DIAGNOSIS — R73.03 PREDIABETES: ICD-10-CM

## 2024-01-11 RX ORDER — LOSARTAN POTASSIUM 50 MG/1
50 TABLET ORAL
Qty: 90 TABLET | Refills: 0 | Status: SHIPPED | OUTPATIENT
Start: 2024-01-11

## 2024-01-11 NOTE — TELEPHONE ENCOUNTER
Refill Routing Note   Medication(s) are not appropriate for processing by [facility] for the following reason(s):        Required labs outdated  Required vitals outdated    [facility] action(s):  Defer   Requires labs : Yes               Appointments  past 12m or future 3m with PCP    Date Provider   Last Visit   1/5/2023 Chrystal Hobson MD   Next Visit   2/29/2024 Chrystal Hobson MD   ED visits in past 90 days: 0        Note composed:9:51 AM 01/11/2024

## 2024-01-11 NOTE — TELEPHONE ENCOUNTER
Care Due:                  Date            Visit Type   Department     Provider  --------------------------------------------------------------------------------                                EP -                              PRIMARY      [facility] Chrystal Hobson  Last Visit: 01-      CARE ([facility])   MEDICINE       Anger                              EP -                              PRIMARY      [facility]    Chrystal Hobson  Next Visit: 02-      CARE ([facility])   MEDICINE       Anger                                                            Last  Test          Frequency    Reason                     Performed    Due Date  --------------------------------------------------------------------------------    CBC.........  12 months..  acyclovir................  01- 01-    CMP.........  12 months..  acyclovir, losartan,       01- 01-                             rosuvastatin.............    Lipid Panel.  12 months..  rosuvastatin.............  04- 03-    [facility] Embedded Care Due Messages. Reference number: [id number].   1/11/2024 12:28:07 AM CST

## 2024-02-15 ENCOUNTER — TELEPHONE (OUTPATIENT)
Dept: FAMILY MEDICINE | Facility: CLINIC | Age: 69
End: 2024-02-15
Payer: MEDICARE

## 2024-02-29 ENCOUNTER — OFFICE VISIT (OUTPATIENT)
Dept: FAMILY MEDICINE | Facility: CLINIC | Age: 69
End: 2024-02-29
Payer: MEDICARE

## 2024-02-29 ENCOUNTER — LAB VISIT (OUTPATIENT)
Dept: LAB | Facility: HOSPITAL | Age: 69
End: 2024-02-29
Attending: FAMILY MEDICINE
Payer: MEDICARE

## 2024-02-29 VITALS
DIASTOLIC BLOOD PRESSURE: 80 MMHG | WEIGHT: 219.13 LBS | HEIGHT: 67 IN | BODY MASS INDEX: 34.39 KG/M2 | TEMPERATURE: 98 F | OXYGEN SATURATION: 95 % | SYSTOLIC BLOOD PRESSURE: 130 MMHG | HEART RATE: 92 BPM | RESPIRATION RATE: 20 BRPM

## 2024-02-29 DIAGNOSIS — E78.5 HYPERLIPIDEMIA, UNSPECIFIED HYPERLIPIDEMIA TYPE: ICD-10-CM

## 2024-02-29 DIAGNOSIS — I10 HYPERTENSION, ESSENTIAL: Primary | ICD-10-CM

## 2024-02-29 DIAGNOSIS — R73.03 PREDIABETES: ICD-10-CM

## 2024-02-29 DIAGNOSIS — Z12.5 ENCOUNTER FOR SCREENING FOR MALIGNANT NEOPLASM OF PROSTATE: ICD-10-CM

## 2024-02-29 DIAGNOSIS — Z23 NEED FOR VACCINATION: ICD-10-CM

## 2024-02-29 DIAGNOSIS — I10 HYPERTENSION, ESSENTIAL: ICD-10-CM

## 2024-02-29 LAB
ALBUMIN SERPL BCP-MCNC: 4.2 G/DL (ref 3.5–5.2)
ALP SERPL-CCNC: 64 U/L (ref 55–135)
ALT SERPL W/O P-5'-P-CCNC: 23 U/L (ref 10–44)
ANION GAP SERPL CALC-SCNC: 8 MMOL/L (ref 8–16)
AST SERPL-CCNC: 24 U/L (ref 10–40)
BASOPHILS # BLD AUTO: 0.04 K/UL (ref 0–0.2)
BASOPHILS NFR BLD: 0.6 % (ref 0–1.9)
BILIRUB SERPL-MCNC: 1 MG/DL (ref 0.1–1)
BUN SERPL-MCNC: 15 MG/DL (ref 8–23)
CALCIUM SERPL-MCNC: 9.9 MG/DL (ref 8.7–10.5)
CHLORIDE SERPL-SCNC: 105 MMOL/L (ref 95–110)
CHOLEST SERPL-MCNC: 157 MG/DL (ref 120–199)
CHOLEST/HDLC SERPL: 3.7 {RATIO} (ref 2–5)
CO2 SERPL-SCNC: 25 MMOL/L (ref 23–29)
COMPLEXED PSA SERPL-MCNC: 1.1 NG/ML (ref 0–4)
CREAT SERPL-MCNC: 1 MG/DL (ref 0.5–1.4)
DIFFERENTIAL METHOD BLD: ABNORMAL
EOSINOPHIL # BLD AUTO: 0.1 K/UL (ref 0–0.5)
EOSINOPHIL NFR BLD: 1.3 % (ref 0–8)
ERYTHROCYTE [DISTWIDTH] IN BLOOD BY AUTOMATED COUNT: 12.9 % (ref 11.5–14.5)
EST. GFR  (NO RACE VARIABLE): >60 ML/MIN/1.73 M^2
ESTIMATED AVG GLUCOSE: 117 MG/DL (ref 68–131)
GLUCOSE SERPL-MCNC: 91 MG/DL (ref 70–110)
HBA1C MFR BLD: 5.7 % (ref 4–5.6)
HCT VFR BLD AUTO: 43.1 % (ref 40–54)
HDLC SERPL-MCNC: 42 MG/DL (ref 40–75)
HDLC SERPL: 26.8 % (ref 20–50)
HGB BLD-MCNC: 14.4 G/DL (ref 14–18)
IMM GRANULOCYTES # BLD AUTO: 0.02 K/UL (ref 0–0.04)
IMM GRANULOCYTES NFR BLD AUTO: 0.3 % (ref 0–0.5)
LDLC SERPL CALC-MCNC: 83.2 MG/DL (ref 63–159)
LYMPHOCYTES # BLD AUTO: 2 K/UL (ref 1–4.8)
LYMPHOCYTES NFR BLD: 31.6 % (ref 18–48)
MCH RBC QN AUTO: 31.2 PG (ref 27–31)
MCHC RBC AUTO-ENTMCNC: 33.4 G/DL (ref 32–36)
MCV RBC AUTO: 94 FL (ref 82–98)
MONOCYTES # BLD AUTO: 0.4 K/UL (ref 0.3–1)
MONOCYTES NFR BLD: 6.7 % (ref 4–15)
NEUTROPHILS # BLD AUTO: 3.7 K/UL (ref 1.8–7.7)
NEUTROPHILS NFR BLD: 59.5 % (ref 38–73)
NONHDLC SERPL-MCNC: 115 MG/DL
NRBC BLD-RTO: 0 /100 WBC
PLATELET # BLD AUTO: 230 K/UL (ref 150–450)
PMV BLD AUTO: 9.8 FL (ref 9.2–12.9)
POTASSIUM SERPL-SCNC: 4.8 MMOL/L (ref 3.5–5.1)
PROT SERPL-MCNC: 7.3 G/DL (ref 6–8.4)
RBC # BLD AUTO: 4.61 M/UL (ref 4.6–6.2)
SODIUM SERPL-SCNC: 138 MMOL/L (ref 136–145)
TRIGL SERPL-MCNC: 159 MG/DL (ref 30–150)
TSH SERPL DL<=0.005 MIU/L-ACNC: 1.17 UIU/ML (ref 0.4–4)
WBC # BLD AUTO: 6.26 K/UL (ref 3.9–12.7)

## 2024-02-29 PROCEDURE — 85025 COMPLETE CBC W/AUTO DIFF WBC: CPT | Performed by: FAMILY MEDICINE

## 2024-02-29 PROCEDURE — 84153 ASSAY OF PSA TOTAL: CPT | Performed by: FAMILY MEDICINE

## 2024-02-29 PROCEDURE — 80053 COMPREHEN METABOLIC PANEL: CPT | Performed by: FAMILY MEDICINE

## 2024-02-29 PROCEDURE — 99214 OFFICE O/P EST MOD 30 MIN: CPT | Mod: 25,S$GLB,, | Performed by: FAMILY MEDICINE

## 2024-02-29 PROCEDURE — 36415 COLL VENOUS BLD VENIPUNCTURE: CPT | Mod: PO | Performed by: FAMILY MEDICINE

## 2024-02-29 PROCEDURE — G0008 ADMIN INFLUENZA VIRUS VAC: HCPCS | Mod: S$GLB,,, | Performed by: FAMILY MEDICINE

## 2024-02-29 PROCEDURE — 90694 VACC AIIV4 NO PRSRV 0.5ML IM: CPT | Mod: S$GLB,,, | Performed by: FAMILY MEDICINE

## 2024-02-29 PROCEDURE — 83036 HEMOGLOBIN GLYCOSYLATED A1C: CPT | Performed by: FAMILY MEDICINE

## 2024-02-29 PROCEDURE — 90677 PCV20 VACCINE IM: CPT | Mod: S$GLB,,, | Performed by: FAMILY MEDICINE

## 2024-02-29 PROCEDURE — 80061 LIPID PANEL: CPT | Performed by: FAMILY MEDICINE

## 2024-02-29 PROCEDURE — 84443 ASSAY THYROID STIM HORMONE: CPT | Performed by: FAMILY MEDICINE

## 2024-02-29 PROCEDURE — G0009 ADMIN PNEUMOCOCCAL VACCINE: HCPCS | Mod: S$GLB,,, | Performed by: FAMILY MEDICINE

## 2024-02-29 RX ORDER — SILDENAFIL 100 MG/1
100 TABLET, FILM COATED ORAL DAILY PRN
Qty: 10 TABLET | Refills: 2 | Status: SHIPPED | OUTPATIENT
Start: 2024-02-29 | End: 2025-02-28

## 2024-02-29 RX ORDER — PHENYLPROPANOLAMINE/CLEMASTINE 75-1.34MG
TABLET, EXTENDED RELEASE ORAL
COMMUNITY

## 2024-02-29 NOTE — PROGRESS NOTES
Subjective:       Patient ID: Jermaine Henderson is a 68 y.o. male.    Chief Complaint: Annual Exam    HPI  The patient is a 68-year-old who is here today for his annual exam.  Overall, he is doing well.  Since our last visit, he is feeling better with a little bit more energy.  He has started going to the gym for weight training.  He is going to get back to walking and maybe jogging.  He is fasting today to have labs today.  He would be willing to have his flu shot and his Prevnar shot today.    Today we discussed the followin)  Hypertension.  Today's blood pressure is 130/80.  He is taking Cozaar which he is tolerating well   2)  Hyperlipidemia.  He is taking Crestor.  He is going to be having fasting labs today    3) pre diabetes.  He denies any polyuria, polydipsia or polyphasia.  His weight remained stable.    4)  ED.  He does request a refill of Viagra which is working well for him    Review of Systems   Constitutional:  Negative for appetite change, chills, diaphoresis, fatigue, fever and unexpected weight change.   HENT:  Negative for congestion, dental problem, ear pain, postnasal drip, rhinorrhea, sinus pressure, sneezing, sore throat and trouble swallowing.    Eyes:  Negative for photophobia, pain, discharge and visual disturbance.   Respiratory:  Negative for cough, chest tightness, shortness of breath and wheezing.    Cardiovascular:  Negative for chest pain, palpitations and leg swelling.   Gastrointestinal:  Negative for abdominal distention, abdominal pain, blood in stool, constipation, diarrhea, nausea and vomiting.   Endocrine: Negative for polydipsia and polyuria.   Genitourinary:  Negative for dysuria, flank pain, frequency, genital sores, hematuria, penile discharge and testicular pain.   Musculoskeletal:  Negative for arthralgias, joint swelling and myalgias.   Skin:  Negative for rash.   Neurological:  Negative for dizziness, syncope, weakness, light-headedness and headaches.    Hematological:  Negative for adenopathy. Does not bruise/bleed easily.   Psychiatric/Behavioral:  Negative for dysphoric mood, self-injury, sleep disturbance and suicidal ideas. The patient is not nervous/anxious.          Objective:      Physical Exam  Constitutional:       General: He is not in acute distress.     Appearance: Normal appearance. He is well-developed.   HENT:      Head: Normocephalic and atraumatic.      Right Ear: Hearing, tympanic membrane, ear canal and external ear normal.      Left Ear: Hearing, tympanic membrane, ear canal and external ear normal.      Nose: Nose normal.      Mouth/Throat:      Mouth: No oral lesions.      Pharynx: No oropharyngeal exudate or posterior oropharyngeal erythema.   Eyes:      General: Lids are normal. No scleral icterus.     Extraocular Movements: Extraocular movements intact.      Conjunctiva/sclera: Conjunctivae normal.      Pupils: Pupils are equal, round, and reactive to light.   Neck:      Thyroid: No thyroid mass or thyromegaly.      Vascular: No carotid bruit.   Cardiovascular:      Rate and Rhythm: Normal rate and regular rhythm. No extrasystoles are present.     Chest Wall: PMI is not displaced.      Heart sounds: Normal heart sounds. No murmur heard.     No gallop.   Pulmonary:      Effort: Pulmonary effort is normal. No accessory muscle usage or respiratory distress.      Breath sounds: Normal breath sounds.   Abdominal:      General: Bowel sounds are normal. There is no abdominal bruit.      Palpations: Abdomen is soft.      Tenderness: There is no abdominal tenderness. There is no rebound.   Musculoskeletal:      Cervical back: Normal range of motion and neck supple.   Lymphadenopathy:      Head:      Right side of head: No submental or submandibular adenopathy.      Left side of head: No submental or submandibular adenopathy.      Cervical:      Right cervical: No superficial, deep or posterior cervical adenopathy.     Left cervical: No  "superficial, deep or posterior cervical adenopathy.      Upper Body:      Right upper body: No supraclavicular adenopathy.      Left upper body: No supraclavicular adenopathy.   Skin:     General: Skin is warm and dry.   Neurological:      Mental Status: He is alert and oriented to person, place, and time.      Cranial Nerves: No cranial nerve deficit.      Sensory: No sensory deficit.   Psychiatric:         Speech: Speech normal.         Behavior: Behavior normal.         Thought Content: Thought content normal.       Blood pressure 130/80, pulse 92, temperature 98.2 °F (36.8 °C), resp. rate 20, height 5' 7" (1.702 m), weight 99.4 kg (219 lb 2.2 oz), SpO2 95 %.Body mass index is 34.32 kg/m².            A/P:  1)  Hypertension.  Well controlled.  Continue with Cozaar   2)  Hyperlipidemia.  Previously well controlled.  Continue with Crestor.  We will check a fasting lipid profile today     3) pre diabetes.  Asymptomatic.  We will check an A1c today   4)  Obesity with BMI of 34.32.  Stable.  He is working on weight loss and physical activity  5) ED. stable.  Continue with Viagra  6) health maintenance issues.  He will receive his flu and Prevnar vaccine today        As long as he does well, I will see him back in 1 year sooner if needed    "

## 2024-03-03 ENCOUNTER — PATIENT MESSAGE (OUTPATIENT)
Dept: FAMILY MEDICINE | Facility: CLINIC | Age: 69
End: 2024-03-03
Payer: MEDICARE

## 2024-04-08 RX ORDER — ROSUVASTATIN CALCIUM 10 MG/1
10 TABLET, COATED ORAL
Qty: 90 TABLET | Refills: 3 | Status: SHIPPED | OUTPATIENT
Start: 2024-04-08

## 2024-04-08 RX ORDER — LOSARTAN POTASSIUM 50 MG/1
50 TABLET ORAL
Qty: 90 TABLET | Refills: 3 | Status: SHIPPED | OUTPATIENT
Start: 2024-04-08

## 2024-04-08 NOTE — TELEPHONE ENCOUNTER
Refill Decision Note   Jermaine Henderson  is requesting a refill authorization.  Brief Assessment and Rationale for Refill:  Approve     Medication Therapy Plan:         Comments:     Note composed:2:17 PM 04/08/2024             Appointments     Last Visit   2/29/2024 Chrystal Hobson MD   Next Visit   Visit date not found Chrystal Hobson MD

## 2024-04-08 NOTE — TELEPHONE ENCOUNTER
No care due was identified.  [facility] Embedded Care Due Messages. Reference number: [id number].   4/08/2024 12:55:38 AM CDT

## 2024-06-28 ENCOUNTER — TELEPHONE (OUTPATIENT)
Dept: FAMILY MEDICINE | Facility: CLINIC | Age: 69
End: 2024-06-28
Payer: MEDICARE

## 2024-06-28 NOTE — TELEPHONE ENCOUNTER
Pt states he has been having back pain for about a month and had a dizzy spell last night and is asking to be seen today.  Informed him that Dr. Hobson is out of the office today and we have no appts here in [facility] for today.  Pt states he will go to urgent care.

## 2024-06-28 NOTE — TELEPHONE ENCOUNTER
----- Message from Michael Arias sent at 6/28/2024  9:30 AM CDT -----  Type:  Sooner Appointment Request  Caller is requesting a sooner appointment.  Caller declined first available appointment listed below.  Caller will not accept being placed on the waitlist and is requesting a message be sent to doctor.    Name of Caller:  pt    When is the first available appointment? July 29    Symptoms:  light headed, back ache for past month, and slower/ disconnect from body    Would the patient rather a call back or a response via [facility]? Call    Best Call Back Number:  [phone]    Additional Information:  Pt feels concerned about light headed, back ache for past month, and slower/ disconnect from body. Wants to be seen today or asap, may go to [facility] if there are not apps. Please call back to advise, thanks!

## 2024-07-19 ENCOUNTER — TELEPHONE (OUTPATIENT)
Dept: FAMILY MEDICINE | Facility: CLINIC | Age: 69
End: 2024-07-19
Payer: MEDICARE

## 2024-07-19 NOTE — TELEPHONE ENCOUNTER
----- Message from Chrystal Hobson MD sent at 7/18/2024  7:32 PM CDT -----  Pls move this pt up to an appt with me next week.  Thank you!

## 2024-07-25 ENCOUNTER — OFFICE VISIT (OUTPATIENT)
Dept: FAMILY MEDICINE | Facility: CLINIC | Age: 69
End: 2024-07-25
Payer: MEDICARE

## 2024-07-25 ENCOUNTER — HOSPITAL ENCOUNTER (OUTPATIENT)
Dept: RADIOLOGY | Facility: HOSPITAL | Age: 69
Discharge: HOME | End: 2024-07-25
Attending: FAMILY MEDICINE
Payer: MEDICARE

## 2024-07-25 VITALS
TEMPERATURE: 98 F | HEART RATE: 75 BPM | DIASTOLIC BLOOD PRESSURE: 76 MMHG | OXYGEN SATURATION: 95 % | HEIGHT: 67 IN | RESPIRATION RATE: 16 BRPM | SYSTOLIC BLOOD PRESSURE: 122 MMHG | BODY MASS INDEX: 34.84 KG/M2 | WEIGHT: 222 LBS

## 2024-07-25 DIAGNOSIS — R31.9 HEMATURIA, UNSPECIFIED TYPE: Primary | ICD-10-CM

## 2024-07-25 DIAGNOSIS — R31.9 HEMATURIA, UNSPECIFIED TYPE: ICD-10-CM

## 2024-07-25 DIAGNOSIS — G89.29 CHRONIC BILATERAL LOW BACK PAIN, UNSPECIFIED WHETHER SCIATICA PRESENT: ICD-10-CM

## 2024-07-25 DIAGNOSIS — M54.50 CHRONIC BILATERAL LOW BACK PAIN, UNSPECIFIED WHETHER SCIATICA PRESENT: ICD-10-CM

## 2024-07-25 PROCEDURE — 87086 URINE CULTURE/COLONY COUNT: CPT | Performed by: FAMILY MEDICINE

## 2024-07-25 PROCEDURE — 72100 X-RAY EXAM L-S SPINE 2/3 VWS: CPT | Mod: 26,,, | Performed by: RADIOLOGY

## 2024-07-25 PROCEDURE — 72100 X-RAY EXAM L-S SPINE 2/3 VWS: CPT | Mod: TC,FY,PO

## 2024-07-25 PROCEDURE — 99213 OFFICE O/P EST LOW 20 MIN: CPT | Mod: S$GLB,,, | Performed by: FAMILY MEDICINE

## 2024-07-25 RX ORDER — ACYCLOVIR 400 MG/1
400 TABLET ORAL DAILY
COMMUNITY

## 2024-07-25 NOTE — PROGRESS NOTES
Subjective:       Patient ID: Jermaine Henderson is a 69 y.o. male.    Chief Complaint: Hematuria (1 time 1 mth ago )    HPI  The patient is a 69-year-old who is here today to discuss low back pain and blood in the urine.    Shortly after our last visit on February 29th, he developed low back pain.  The low back pain is at his belt line and goes across his low back.  Occasionally he felt as if the pain will go into his right buttocks but that has improved.  His pain is worse if he sits or lays down.  He describes his pain as a sharp achy pain.  At its worst, the pain is a 6.  He can be pain free at times.    Approximately a month ago, his low back pain was really intense and he felt achy and as if he was in slow motion.  Because of the worsening back pain and these new symptoms, he decided to go to the urgent care clinic.  Right before leaving for the urgent care clinic, he went to the bathroom before leaving.  When he urinated, he urinated blood initially and then urine.  At the urgent care clinic, they told him that they were not equipped to do a lot of test but suspected he may have a prostate issue.  He was treated with Cipro for a week.    He notes that his back pain is still present but he has only had that 1 episode of blood in his urine.  He has not experienced abdominal pain, nausea, vomiting, or dysuria.    Review of Systems   Constitutional:  Negative for appetite change, chills, diaphoresis, fatigue, fever and unexpected weight change.   HENT:  Negative for congestion, ear pain, postnasal drip, rhinorrhea, sinus pressure, sneezing, sore throat and trouble swallowing.    Eyes:  Negative for pain, discharge and visual disturbance.   Respiratory:  Negative for cough, chest tightness, shortness of breath and wheezing.    Cardiovascular:  Negative for chest pain, palpitations and leg swelling.   Gastrointestinal:  Negative for abdominal distention, abdominal pain, blood in stool, constipation, diarrhea, nausea  and vomiting.   Genitourinary:  Positive for hematuria.   Musculoskeletal:  Positive for back pain.   Skin:  Negative for rash.         Objective:      Physical Exam  Constitutional:       General: He is not in acute distress.     Appearance: Normal appearance. He is well-developed.   HENT:      Head: Normocephalic and atraumatic.      Right Ear: Hearing, tympanic membrane, ear canal and external ear normal.      Left Ear: Hearing, tympanic membrane, ear canal and external ear normal.      Nose: Nose normal.      Mouth/Throat:      Mouth: No oral lesions.      Pharynx: No oropharyngeal exudate or posterior oropharyngeal erythema.   Eyes:      General: Lids are normal. No scleral icterus.     Extraocular Movements: Extraocular movements intact.      Conjunctiva/sclera: Conjunctivae normal.      Pupils: Pupils are equal, round, and reactive to light.   Neck:      Thyroid: No thyroid mass or thyromegaly.      Vascular: No carotid bruit.   Cardiovascular:      Rate and Rhythm: Normal rate and regular rhythm. No extrasystoles are present.     Chest Wall: PMI is not displaced.      Heart sounds: Normal heart sounds. No murmur heard.     No gallop.   Pulmonary:      Effort: Pulmonary effort is normal. No accessory muscle usage or respiratory distress.      Breath sounds: Normal breath sounds.   Abdominal:      General: Bowel sounds are normal. There is no abdominal bruit.      Palpations: Abdomen is soft.      Tenderness: There is no abdominal tenderness. There is no rebound.   Musculoskeletal:      Cervical back: Normal range of motion and neck supple.      Lumbar back: Tenderness present. Normal range of motion. Negative right straight leg raise test and negative left straight leg raise test.   Lymphadenopathy:      Head:      Right side of head: No submental or submandibular adenopathy.      Left side of head: No submental or submandibular adenopathy.      Cervical:      Right cervical: No superficial, deep or posterior  "cervical adenopathy.     Left cervical: No superficial, deep or posterior cervical adenopathy.      Upper Body:      Right upper body: No supraclavicular adenopathy.      Left upper body: No supraclavicular adenopathy.   Skin:     General: Skin is warm and dry.   Neurological:      Mental Status: He is alert and oriented to person, place, and time.       Blood pressure 122/76, pulse 75, temperature 98.1 °F (36.7 °C), temperature source Oral, resp. rate 16, height 5' 7" (1.702 m), weight 100.7 kg (222 lb 0.1 oz), SpO2 95%.Body mass index is 34.77 kg/m².            A/P:  1) low back pain likely musculoskeletal.  Persistent but new to me.  We are going to check an x-ray of his lumbar spine.  We may consider PT after this is done   2)  Isolated episode of gross hematuria.  Currently asymptomatic.  We are going to check a UA and a CT scan.  We may consider a urology referral for a cystoscope depending on these test results.    "

## 2024-07-26 ENCOUNTER — PATIENT MESSAGE (OUTPATIENT)
Dept: FAMILY MEDICINE | Facility: CLINIC | Age: 69
End: 2024-07-26
Payer: MEDICARE

## 2024-07-26 DIAGNOSIS — K80.80 BILIARY CALCULUS OF OTHER SITE WITHOUT OBSTRUCTION: Primary | ICD-10-CM

## 2024-07-26 DIAGNOSIS — R31.0 HEMATURIA, GROSS: ICD-10-CM

## 2024-07-26 DIAGNOSIS — N32.89 BLADDER WALL THICKENING: ICD-10-CM

## 2024-07-29 ENCOUNTER — HOSPITAL ENCOUNTER (OUTPATIENT)
Dept: RADIOLOGY | Facility: HOSPITAL | Age: 69
Discharge: HOME | End: 2024-07-29
Attending: FAMILY MEDICINE
Payer: MEDICARE

## 2024-07-29 DIAGNOSIS — R31.9 HEMATURIA, UNSPECIFIED TYPE: ICD-10-CM

## 2024-07-29 PROCEDURE — 74176 CT ABD & PELVIS W/O CONTRAST: CPT | Mod: 26,,, | Performed by: RADIOLOGY

## 2024-07-29 PROCEDURE — 74176 CT ABD & PELVIS W/O CONTRAST: CPT | Mod: TC

## 2024-07-30 ENCOUNTER — TELEPHONE (OUTPATIENT)
Dept: FAMILY MEDICINE | Facility: CLINIC | Age: 69
End: 2024-07-30
Payer: MEDICARE

## 2024-07-30 NOTE — TELEPHONE ENCOUNTER
Pt aware, pt wants to see Dr Mahan in [place] for Urology .  Referral pended . Gen Surgery appt sched 8/13.     Pt was reading report on the portal ans had some concerns about a hernia , pt would like an explanation . Pls advise

## 2024-07-30 NOTE — TELEPHONE ENCOUNTER
----- Message from Gayle Jensen sent at 7/30/2024  9:13 AM CDT -----  Regarding: results  Type:  Test Results    Who Called: Pt    Name of Test (Lab/Mammo/Etc): CT    Date of Test: 7/29/24      Where the test was performed: [facility]    Would the patient rather a call back or a response via [facility]? Call back    Best Call Back Number: [phone]    Additional Information:  Need results explain. Thank you

## 2024-08-13 ENCOUNTER — OFFICE VISIT (OUTPATIENT)
Dept: SURGERY | Facility: CLINIC | Age: 69
End: 2024-08-13
Payer: MEDICARE

## 2024-08-13 VITALS
DIASTOLIC BLOOD PRESSURE: 75 MMHG | HEIGHT: 67 IN | SYSTOLIC BLOOD PRESSURE: 154 MMHG | WEIGHT: 227.75 LBS | HEART RATE: 80 BPM | BODY MASS INDEX: 35.75 KG/M2 | TEMPERATURE: 97 F

## 2024-08-13 DIAGNOSIS — K80.80 BILIARY CALCULUS OF OTHER SITE WITHOUT OBSTRUCTION: ICD-10-CM

## 2024-08-13 PROCEDURE — 99213 OFFICE O/P EST LOW 20 MIN: CPT | Mod: PBBFAC,PO | Performed by: STUDENT IN AN ORGANIZED HEALTH CARE EDUCATION/TRAINING PROGRAM

## 2024-08-13 PROCEDURE — 99999 PR PBB SHADOW E&M-EST. PATIENT-LVL III: CPT | Mod: PBBFAC,,, | Performed by: STUDENT IN AN ORGANIZED HEALTH CARE EDUCATION/TRAINING PROGRAM

## 2024-08-13 PROCEDURE — 99203 OFFICE O/P NEW LOW 30 MIN: CPT | Mod: S$PBB,,, | Performed by: STUDENT IN AN ORGANIZED HEALTH CARE EDUCATION/TRAINING PROGRAM

## 2024-08-13 NOTE — PROGRESS NOTES
History & Physical    Subjective     History of Present Illness:  Patient is a 69 y.o. male presents with lower back pain.  This led to a CT scan which showed a calcified gallstone.  Patient denies postprandial right upper quadrant abdominal pain.  His back pain is lower.  Chief Complaint   Patient presents with    Consult     Gallbladder       Review of patient's allergies indicates:  No Known Allergies    Current Outpatient Medications   Medication Sig Dispense Refill    ergocalciferol, vitamin D2, (VITAMIN D ORAL) Take by mouth Daily.      losartan (COZAAR) 50 MG tablet TAKE 1 TABLET BY MOUTH EVERY DAY 90 tablet 3    rosuvastatin (CRESTOR) 10 MG tablet TAKE 1 TABLET BY MOUTH EVERY DAY 90 tablet 3    acyclovir (ZOVIRAX) 400 MG tablet Take 400 mg by mouth Daily. (Patient not taking: Reported on 8/13/2024)      ibuprofen 200 mg Cap 200 mg as needed. (Patient not taking: Reported on 8/13/2024)      sildenafiL (VIAGRA) 100 MG tablet Take 1 tablet (100 mg total) by mouth daily as needed for Erectile Dysfunction. (Patient not taking: Reported on 8/13/2024) 10 tablet 2     Current Facility-Administered Medications   Medication Dose Route Frequency Provider Last Rate Last Admin    EPINEPHrine (EPIPEN) 0.3 mg/0.3 mL pen injection 0.3 mg  0.3 mg Intramuscular PRN Chrystal Hobson MD           Past Medical History:   Diagnosis Date    Allergy     Bronchiectasis 02/2018    noted on CT    Cholelithiases     Diverticula of colon     noted on cscope    Fatty liver     noted 2/14    H/O cardiovascular stress test     normal 3/18    History of colon polyps     HSV-2 infection     Hypertension     OCTAVIO (obstructive sleep apnea)     uses CPAP    Prediabetes     Renal calculi     Vitamin D deficiency      Past Surgical History:   Procedure Laterality Date    COLONOSCOPY N/A 3/1/2021    Procedure: COLONOSCOPY;  Surgeon: Byron Kwan Jr., MD;  Location: [facility];  Service: Endoscopy;  Laterality: N/A;    FRACTURE SURGERY       "right ankle    VASECTOMY       Family History   Problem Relation Name Age of Onset    Cancer Mother          thyroid cancer    Heart disease Father      Cancer Father          CML    Stroke Father      Stroke Maternal Aunt      Cancer Maternal Grandmother          breast cancer    Parkinsonism Maternal Uncle       Social History     Tobacco Use    Smoking status: Some Days     Types: Cigars    Smokeless tobacco: Never   Substance Use Topics    Alcohol use: Yes     Comment: occ    Drug use: No        Review of Systems:  Review of Systems   Constitutional: Negative.  Negative for fatigue and fever.   HENT: Negative.     Eyes: Negative.    Respiratory: Negative.  Negative for shortness of breath.    Cardiovascular: Negative.  Negative for chest pain.   Gastrointestinal:  Negative for abdominal pain.   Endocrine: Negative.    Genitourinary: Negative.    Musculoskeletal:  Positive for back pain.   Skin: Negative.    Allergic/Immunologic: Negative.    Neurological: Negative.    Hematological: Negative.    Psychiatric/Behavioral: Negative.            Objective     Vital Signs (Most Recent)  Temp: 97.4 °F (36.3 °C) (08/13/24 0835)  Pulse: 80 (08/13/24 0835)  BP: (!) 154/75 (08/13/24 0835)  5' 7" (1.702 m)  103.3 kg (227 lb 11.8 oz)     Physical Exam:  Physical Exam  Constitutional:       General: He is not in acute distress.     Appearance: Normal appearance. He is not ill-appearing, toxic-appearing or diaphoretic.   HENT:      Head: Normocephalic.      Nose: Nose normal.   Eyes:      Conjunctiva/sclera: Conjunctivae normal.   Cardiovascular:      Rate and Rhythm: Normal rate and regular rhythm.   Pulmonary:      Effort: Pulmonary effort is normal.   Abdominal:      Palpations: Abdomen is soft.   Musculoskeletal:         General: Normal range of motion.      Cervical back: Normal range of motion.   Skin:     General: Skin is warm.   Neurological:      General: No focal deficit present.      Mental Status: He is alert. "   Psychiatric:         Mood and Affect: Mood normal.           Diagnostic Results:  Calcified gallstone without cholecystitis on CT imaging     Assessment and Plan   69-year-old male with gallstones.  These are asymptomatic.    PLAN:  No indications for surgical intervention at this point in time.  Patient develops postprandial right upper quadrant abdominal pain, he will call the set up follow-up as needed.

## 2024-11-07 ENCOUNTER — TELEPHONE (OUTPATIENT)
Dept: FAMILY MEDICINE | Facility: CLINIC | Age: 69
End: 2024-11-07
Payer: MEDICARE

## 2024-11-07 DIAGNOSIS — R06.6 INTRACTABLE HICCUPS: Primary | ICD-10-CM

## 2024-11-07 NOTE — TELEPHONE ENCOUNTER
"----- Message from Veronique sent at 11/6/2024  9:44 AM CST -----  Regarding: Medical Advice  "Type:  Patient Call Back    Who Called:PT    What is the reqeust in detail:Requesting call back. Pt states he had Hiccups for almost 48hrs. Please advise    Can the clinic reply by [facility]?no     Best Call Back Number:[phone]      Additional Information:Pt states his wife looked up hiccups and stated it can give him other health issues. Pt would like a call back to discuss  "

## 2024-11-07 NOTE — TELEPHONE ENCOUNTER
Spoke to pt and he states he had constant hiccups from Monday evening (11/4/24) through yesterday evening.  They finally went away late last night.  He does not know why.  But he is concerned and wants to now if he needs a specialist.  He says he and his wife looked it up on google and it said that it can be related to underlying health conditions.

## 2024-11-08 NOTE — TELEPHONE ENCOUNTER
----- Message from Anna sent at 11/7/2024  8:39 AM CST -----  Contact: Patient  Type:  Needs Medical Advice    Who Called:   Patient    Symptoms (please be specific):   Hiccups   How long has patient had these symptoms:   11/4 - 11/6    Pharmacy name and phone #:        [id number] [place] - [address]  [address]  [facility] [address]  Phone: [phone] Fax: [phone]    Would the patient rather a call back or a response via [facility]?   Call back  Best Call Back Number:   [phone]    Additional Information:    States he would like to speak with someone - please call - thank you

## 2024-11-11 ENCOUNTER — TELEPHONE (OUTPATIENT)
Dept: GASTROENTEROLOGY | Facility: CLINIC | Age: 69
End: 2024-11-11
Payer: MEDICARE

## 2024-11-11 NOTE — TELEPHONE ENCOUNTER
Called and spoke with the patient, patient notified of the need to reschedule his appointment as provider out, appointment rescheduled with the patient, patient verbalized understanding of this.

## 2024-11-15 ENCOUNTER — OFFICE VISIT (OUTPATIENT)
Dept: GASTROENTEROLOGY | Facility: CLINIC | Age: 69
End: 2024-11-15
Payer: MEDICARE

## 2024-11-15 VITALS — BODY MASS INDEX: 34.84 KG/M2 | WEIGHT: 222 LBS | HEIGHT: 67 IN

## 2024-11-15 DIAGNOSIS — E66.01 SEVERE OBESITY (BMI 35.0-39.9) WITH COMORBIDITY: ICD-10-CM

## 2024-11-15 DIAGNOSIS — K57.90 DIVERTICULOSIS: ICD-10-CM

## 2024-11-15 DIAGNOSIS — Z86.0100 HISTORY OF COLON POLYPS: ICD-10-CM

## 2024-11-15 DIAGNOSIS — K76.0 FATTY LIVER: ICD-10-CM

## 2024-11-15 DIAGNOSIS — R06.6 HICCUPS: Primary | ICD-10-CM

## 2024-11-15 DIAGNOSIS — R13.10 DYSPHAGIA, UNSPECIFIED TYPE: ICD-10-CM

## 2024-11-15 DIAGNOSIS — K21.9 GASTROESOPHAGEAL REFLUX DISEASE, UNSPECIFIED WHETHER ESOPHAGITIS PRESENT: ICD-10-CM

## 2024-11-15 PROCEDURE — 99214 OFFICE O/P EST MOD 30 MIN: CPT | Mod: PBBFAC,PO

## 2024-11-15 PROCEDURE — 99999 PR PBB SHADOW E&M-EST. PATIENT-LVL IV: CPT | Mod: PBBFAC,,,

## 2024-11-15 RX ORDER — HYOSCYAMINE SULFATE 0.12 MG/1
0.12 TABLET SUBLINGUAL EVERY 4 HOURS PRN
Status: CANCELLED | OUTPATIENT
Start: 2024-11-15

## 2024-11-15 NOTE — PROGRESS NOTES
Subjective:       Patient ID: Jermaine Henderson is a 69 y.o. male Body mass index is 34.77 kg/m².    Chief Complaint: Hiccups    This patient is new to me.  Referring Provider: Dr. Chrystal Hobson for intractable hiccups.  Established patient of Dr. Kwan.     GI Problem  The primary symptoms include fatigue. Primary symptoms do not include fever, weight loss, abdominal pain, nausea, vomiting, diarrhea, melena, hematemesis, jaundice, hematochezia or dysuria.   The illness is also significant for dysphagia (Feels as though food is slow to move through esophagus after swallowing at times; also spontaneously feels as though liquids or saliva go down the wrong way causing him to cough and gag). The illness does not include chills, odynophagia, bloating or constipation (Typically has 1-2 BMs daily rated stool 3 on [place] scale; pasty at times). Associated symptoms comments: CHIEF COMPLAINT:  Chronic recurrent hiccups 3-4 times the past year that worsened last week.  Last week on Monday evening started experiencing hiccups that improved during sleep; hiccups reoccurred Tuesday after moving around and lasted all day until the afternoon but returned around 5:30 p.m. and resolved when he slept.  Wednesday hiccups returned after eating lunch and have resolved since.  He does report experiencing pressure in upper chest Thursday as though hiccups would start again, but the did not.  He tried drinking vinegar, drinking honey, holding his breath, and drinking large volumes of water with no improvement.  Sitting down and pressing his knees to his chest seem to help improve hiccups when they were present & in the past drinking warm water seemed to help. Significant associated medical issues include GERD (intermittent reflux after overindulging typically after spending time at the casino; denies any reflux the past 2 months; at times has to elevate his bed), liver disease (history of fatty liver) and hemorrhoids. Associated medical  issues do not include inflammatory bowel disease, gallstones, alcohol abuse, PUD, gastric bypass, bowel resection, irritable bowel syndrome or diverticulitis (diverticulosis).     Review of Systems   Constitutional:  Positive for fatigue. Negative for activity change, appetite change, chills, diaphoresis, fever, unexpected weight change and weight loss.   HENT:  Positive for trouble swallowing. Negative for sore throat.    Respiratory:  Positive for cough (intermittent after swallowing when items feels as though they go down the wrong way). Negative for choking and shortness of breath.    Cardiovascular:  Negative for chest pain.   Gastrointestinal:  Positive for dysphagia (Feels as though food is slow to move through esophagus after swallowing at times; also spontaneously feels as though liquids or saliva go down the wrong way causing him to cough and gag). Negative for abdominal distention, abdominal pain, anal bleeding, bloating, blood in stool, constipation (Typically has 1-2 BMs daily rated stool 3 on [place] scale; pasty at times), diarrhea, hematemesis, hematochezia, jaundice, melena, nausea, rectal pain and vomiting.   Genitourinary:  Negative for dysuria.       No LMP for male patient.  Past Medical History:   Diagnosis Date    Allergy     Bronchiectasis 02/2018    noted on CT    Cholelithiases     Diverticula of colon     noted on cscope    Fatty liver     noted 2/14    H/O cardiovascular stress test     normal 3/18    History of colon polyps     HSV-2 infection     Hypertension     OCTAVIO (obstructive sleep apnea)     uses CPAP    Prediabetes     Renal calculi     Vitamin D deficiency      Past Surgical History:   Procedure Laterality Date    COLONOSCOPY N/A 3/1/2021    Procedure: COLONOSCOPY;  Surgeon: Byron Kwan Jr., MD;  Location: [facility];  Service: Endoscopy;  Laterality: N/A;    FRACTURE SURGERY      right ankle    VASECTOMY       Family History   Problem Relation Name Age of Onset    Cancer  Mother          thyroid cancer    Heart disease Father      Cancer Father          CML    Stroke Father      Stroke Maternal Aunt      Parkinsonism Maternal Uncle      Cancer Maternal Grandmother          breast cancer    Colon cancer Neg Hx      Esophageal cancer Neg Hx      Stomach cancer Neg Hx       Social History     Tobacco Use    Smoking status: Some Days     Types: Cigars    Smokeless tobacco: Never   Substance Use Topics    Alcohol use: Yes     Comment: occ    Drug use: No     Wt Readings from Last 10 Encounters:   11/15/24 100.7 kg (222 lb 0.1 oz)   08/13/24 103.3 kg (227 lb 11.8 oz)   07/25/24 100.7 kg (222 lb 0.1 oz)   02/29/24 99.4 kg (219 lb 2.2 oz)   01/05/23 100.3 kg (221 lb 0.2 oz)   10/19/21 85.5 kg (188 lb 7.9 oz)   02/26/21 93 kg (205 lb)   09/02/20 98.9 kg (218 lb 0.6 oz)   10/09/19 95.3 kg (210 lb 3.2 oz)   11/06/18 93.5 kg (206 lb 3.2 oz)     Lab Results   Component Value Date    WBC 6.26 02/29/2024    HGB 14.4 02/29/2024    HCT 43.1 02/29/2024    MCV 94 02/29/2024     02/29/2024     CMP  Sodium   Date Value Ref Range Status   02/29/2024 138 136 - 145 mmol/L Final     Potassium   Date Value Ref Range Status   02/29/2024 4.8 3.5 - 5.1 mmol/L Final     Chloride   Date Value Ref Range Status   02/29/2024 105 95 - 110 mmol/L Final     CO2   Date Value Ref Range Status   02/29/2024 25 23 - 29 mmol/L Final     Glucose   Date Value Ref Range Status   02/29/2024 91 70 - 110 mg/dL Final     BUN   Date Value Ref Range Status   02/29/2024 15 8 - 23 mg/dL Final     Creatinine   Date Value Ref Range Status   02/29/2024 1.0 0.5 - 1.4 mg/dL Final     Calcium   Date Value Ref Range Status   02/29/2024 9.9 8.7 - 10.5 mg/dL Final     Total Protein   Date Value Ref Range Status   02/29/2024 7.3 6.0 - 8.4 g/dL Final     Albumin   Date Value Ref Range Status   02/29/2024 4.2 3.5 - 5.2 g/dL Final     Total Bilirubin   Date Value Ref Range Status   02/29/2024 1.0 0.1 - 1.0 mg/dL Final     Comment:     For  infants and newborns, interpretation of results should be based  on gestational age, weight and in agreement with clinical  observations.    Premature Infant recommended reference ranges:  Up to 24 hours.............<8.0 mg/dL  Up to 48 hours............<12.0 mg/dL  3-5 days..................<15.0 mg/dL  6-29 days.................<15.0 mg/dL       Alkaline Phosphatase   Date Value Ref Range Status   02/29/2024 64 55 - 135 U/L Final     AST   Date Value Ref Range Status   02/29/2024 24 10 - 40 U/L Final     ALT   Date Value Ref Range Status   02/29/2024 23 10 - 44 U/L Final     Anion Gap   Date Value Ref Range Status   02/29/2024 8 8 - 16 mmol/L Final     eGFR if    Date Value Ref Range Status   10/19/2021 >60.0 >60 mL/min/1.73 m^2 Final     eGFR if non    Date Value Ref Range Status   10/19/2021 >60.0 >60 mL/min/1.73 m^2 Final     Comment:     Calculation used to obtain the estimated glomerular filtration  rate (eGFR) is the CKD-EPI equation.        Lab Results   Component Value Date    TSH 1.172 02/29/2024     Reviewed prior medical records including radiology report of CT renal stone study 07/29/2024, abdominal ultrasound 02/11/2014 & endoscopy history (see surgical history).    Objective:      Physical Exam  Vitals and nursing note reviewed.   Constitutional:       General: He is not in acute distress.     Appearance: Normal appearance. He is not ill-appearing.   HENT:      Mouth/Throat:      Lips: Pink. No lesions.   Cardiovascular:      Heart sounds: Normal heart sounds.   Pulmonary:      Effort: Pulmonary effort is normal. No respiratory distress.      Breath sounds: Normal breath sounds.   Abdominal:      General: Bowel sounds are normal. There is no distension or abdominal bruit. There are no signs of injury.      Palpations: Abdomen is soft. There is no shifting dullness, fluid wave, hepatomegaly, splenomegaly or mass.      Tenderness: There is no abdominal tenderness. There  is no guarding or rebound. Negative signs include Sears's sign, Rovsing's sign and McBurney's sign.   Skin:     General: Skin is warm and dry.      Coloration: Skin is not jaundiced or pale.   Neurological:      Mental Status: He is alert and oriented to person, place, and time.   Psychiatric:         Attention and Perception: Attention normal.         Mood and Affect: Mood normal.         Speech: Speech normal.         Behavior: Behavior normal.         Assessment:       1. History of hiccups    2. Dysphagia, unspecified type    3. Gastroesophageal reflux disease, unspecified whether esophagitis present    4. Diverticulosis    5. History of colon polyps    6. Fatty liver    7. Severe obesity (BMI 35.0-39.9) with comorbidity        Plan:       History of hiccups  - schedule EGD, discussed procedure with patient, including risks and benefits, patient verbalized understanding  -     CT Chest With Contrast; Future; Expected date: 11/15/2024  -     Creatinine, serum; Future; Expected date: 11/15/2024  - consider Levsin    Dysphagia, unspecified type  - schedule EGD, discussed procedure with patient and possible esophageal dilation may be performed during procedure if indicated, patient verbalized understanding  - recommend to eat smaller more frequent meals and to eat slowly and advised to eat a soft diet. Take medications one at a time with a full glass of water.  - possible UGI/esophagram/esophageal manometry if symptoms persist  - consider PPI pending EGD w/ biopsies    Gastroesophageal reflux disease, unspecified whether esophagitis present  - schedule EGD, discussed procedure with patient, including risks and benefits, patient verbalized understanding  -Avoid large meals, avoid eating within 2-3 hours of bedtime (avoid late night eating & lying down soon after eating), elevate head of bed if nocturnal symptoms are present, smoking cessation (if current smoker), & weight loss (if overweight).   -Avoid known foods  which trigger reflux symptoms & to minimize/avoid high-fat foods, chocolate, caffeine, citrus, alcohol, & tomato products.  -Avoid/limit use of NSAID's, since they can cause GI upset, bleeding, and/or ulcers. If needed, take with food.  - consider PPI pending EGD w/ biopsies    Diverticulosis  Recommend high fiber diet (20-30 grams of fiber daily)/OTC fiber supplements daily as directed.    History of colon polyps  - follow-up for surveillance colonoscopy 03/2026    Fatty liver  For fatty liver recommend: low fat, low cholesterol diet, maintain good control of blood sugars and cholesterol levels, exercise, weight loss (if overweight), minimize/avoid alcohol and tylenol products, & follow-up with PCP for continued evaluation and management; if specialist is needed, recommend seeing hepatology.    Severe obesity (BMI 35.0-39.9) with comorbidity  - recommend diet and exercise as tolerated    Follow up in about 4 weeks (around 12/13/2024), or if symptoms worsen or fail to improve.      If no improvement in symptoms or symptoms worsen, call/follow-up at clinic or go to ER.        Total time spent on the encounter includes face to face time and non-face to face time preparing to see the patient (eg, review of tests), Obtaining and/or reviewing separately obtained history, Documenting clinical information in the electronic or other health record, Independently interpreting results (not separately reported) and communicating results to the patient/family/caregiver, or Care coordination (not separately reported).     A dictation software program was used for this note. Please expect some simple typographical  errors in this note.

## 2024-12-03 DIAGNOSIS — R06.6 HICCOUGH: Primary | ICD-10-CM

## 2024-12-04 ENCOUNTER — HOSPITAL ENCOUNTER (OUTPATIENT)
Dept: RADIOLOGY | Facility: HOSPITAL | Age: 69
Discharge: HOME | End: 2024-12-04
Payer: MEDICARE

## 2024-12-04 DIAGNOSIS — R06.6 HICCUPS: ICD-10-CM

## 2024-12-04 PROCEDURE — 25500020 PHARM REV CODE 255: Mod: PO

## 2024-12-04 PROCEDURE — 71260 CT THORAX DX C+: CPT | Mod: 26,,, | Performed by: STUDENT IN AN ORGANIZED HEALTH CARE EDUCATION/TRAINING PROGRAM

## 2024-12-04 PROCEDURE — 71260 CT THORAX DX C+: CPT | Mod: TC,PO

## 2024-12-04 RX ADMIN — IOHEXOL 75 ML: 350 INJECTION, SOLUTION INTRAVENOUS at 04:12

## 2025-02-21 DIAGNOSIS — Z00.00 ENCOUNTER FOR MEDICARE ANNUAL WELLNESS EXAM: ICD-10-CM

## 2025-02-28 ENCOUNTER — TELEPHONE (OUTPATIENT)
Dept: GASTROENTEROLOGY | Facility: CLINIC | Age: 70
End: 2025-02-28
Payer: MEDICARE

## 2025-02-28 NOTE — TELEPHONE ENCOUNTER
Spoke with pt. Rescheduled procedure due to Dr. Kwan not being available on date of procedure. Pt verbalized understanding to new date & provider.

## 2025-03-05 ENCOUNTER — HOSPITAL ENCOUNTER (OUTPATIENT)
Facility: HOSPITAL | Age: 70
Discharge: HOME | End: 2025-03-05
Attending: STUDENT IN AN ORGANIZED HEALTH CARE EDUCATION/TRAINING PROGRAM | Admitting: STUDENT IN AN ORGANIZED HEALTH CARE EDUCATION/TRAINING PROGRAM
Payer: MEDICARE

## 2025-03-05 ENCOUNTER — ANESTHESIA (OUTPATIENT)
Dept: ENDOSCOPY | Facility: HOSPITAL | Age: 70
End: 2025-03-05
Payer: MEDICARE

## 2025-03-05 ENCOUNTER — ANESTHESIA EVENT (OUTPATIENT)
Dept: ENDOSCOPY | Facility: HOSPITAL | Age: 70
End: 2025-03-05
Payer: MEDICARE

## 2025-03-05 VITALS
WEIGHT: 225 LBS | BODY MASS INDEX: 35.31 KG/M2 | HEIGHT: 67 IN | OXYGEN SATURATION: 95 % | TEMPERATURE: 97 F | RESPIRATION RATE: 16 BRPM | HEART RATE: 79 BPM | DIASTOLIC BLOOD PRESSURE: 79 MMHG | SYSTOLIC BLOOD PRESSURE: 129 MMHG

## 2025-03-05 DIAGNOSIS — R13.10 DYSPHAGIA: ICD-10-CM

## 2025-03-05 DIAGNOSIS — R13.10 DYSPHAGIA, UNSPECIFIED TYPE: Primary | ICD-10-CM

## 2025-03-05 PROCEDURE — 63600175 PHARM REV CODE 636 W HCPCS: Mod: PO | Performed by: NURSE ANESTHETIST, CERTIFIED REGISTERED

## 2025-03-05 PROCEDURE — 63600175 PHARM REV CODE 636 W HCPCS: Mod: PO | Performed by: STUDENT IN AN ORGANIZED HEALTH CARE EDUCATION/TRAINING PROGRAM

## 2025-03-05 PROCEDURE — 43248 EGD GUIDE WIRE INSERTION: CPT | Mod: PO | Performed by: STUDENT IN AN ORGANIZED HEALTH CARE EDUCATION/TRAINING PROGRAM

## 2025-03-05 PROCEDURE — C1769 GUIDE WIRE: HCPCS | Mod: PO | Performed by: STUDENT IN AN ORGANIZED HEALTH CARE EDUCATION/TRAINING PROGRAM

## 2025-03-05 PROCEDURE — 37000008 HC ANESTHESIA 1ST 15 MINUTES: Mod: PO | Performed by: STUDENT IN AN ORGANIZED HEALTH CARE EDUCATION/TRAINING PROGRAM

## 2025-03-05 PROCEDURE — 37000009 HC ANESTHESIA EA ADD 15 MINS: Mod: PO | Performed by: STUDENT IN AN ORGANIZED HEALTH CARE EDUCATION/TRAINING PROGRAM

## 2025-03-05 PROCEDURE — 43239 EGD BIOPSY SINGLE/MULTIPLE: CPT | Mod: 59,,, | Performed by: STUDENT IN AN ORGANIZED HEALTH CARE EDUCATION/TRAINING PROGRAM

## 2025-03-05 PROCEDURE — 88305 TISSUE EXAM BY PATHOLOGIST: CPT | Mod: PO | Performed by: PATHOLOGY

## 2025-03-05 PROCEDURE — 88305 TISSUE EXAM BY PATHOLOGIST: CPT | Mod: 26,,, | Performed by: PATHOLOGY

## 2025-03-05 PROCEDURE — 43453 DILATE ESOPHAGUS: CPT | Mod: PO | Performed by: STUDENT IN AN ORGANIZED HEALTH CARE EDUCATION/TRAINING PROGRAM

## 2025-03-05 PROCEDURE — 43239 EGD BIOPSY SINGLE/MULTIPLE: CPT | Mod: 59,PO | Performed by: STUDENT IN AN ORGANIZED HEALTH CARE EDUCATION/TRAINING PROGRAM

## 2025-03-05 PROCEDURE — 43248 EGD GUIDE WIRE INSERTION: CPT | Mod: ,,, | Performed by: STUDENT IN AN ORGANIZED HEALTH CARE EDUCATION/TRAINING PROGRAM

## 2025-03-05 RX ORDER — LIDOCAINE HYDROCHLORIDE 20 MG/ML
INJECTION INTRAVENOUS
Status: DISCONTINUED | OUTPATIENT
Start: 2025-03-05 | End: 2025-03-05

## 2025-03-05 RX ORDER — PANTOPRAZOLE SODIUM 40 MG/1
40 TABLET, DELAYED RELEASE ORAL DAILY
Qty: 90 TABLET | Refills: 3 | Status: SHIPPED | OUTPATIENT
Start: 2025-03-05 | End: 2026-03-05

## 2025-03-05 RX ORDER — PROPOFOL 10 MG/ML
VIAL (ML) INTRAVENOUS CONTINUOUS PRN
Status: DISCONTINUED | OUTPATIENT
Start: 2025-03-05 | End: 2025-03-05

## 2025-03-05 RX ORDER — PROPOFOL 10 MG/ML
VIAL (ML) INTRAVENOUS
Status: DISCONTINUED | OUTPATIENT
Start: 2025-03-05 | End: 2025-03-05

## 2025-03-05 RX ORDER — SODIUM CHLORIDE 0.9 % (FLUSH) 0.9 %
10 SYRINGE (ML) INJECTION
Status: DISCONTINUED | OUTPATIENT
Start: 2025-03-05 | End: 2025-03-05 | Stop reason: HOSPADM

## 2025-03-05 RX ORDER — SODIUM CHLORIDE, SODIUM LACTATE, POTASSIUM CHLORIDE, CALCIUM CHLORIDE 600; 310; 30; 20 MG/100ML; MG/100ML; MG/100ML; MG/100ML
INJECTION, SOLUTION INTRAVENOUS CONTINUOUS
Status: DISCONTINUED | OUTPATIENT
Start: 2025-03-05 | End: 2025-03-05 | Stop reason: HOSPADM

## 2025-03-05 RX ADMIN — SODIUM CHLORIDE, POTASSIUM CHLORIDE, SODIUM LACTATE AND CALCIUM CHLORIDE: 600; 310; 30; 20 INJECTION, SOLUTION INTRAVENOUS at 11:03

## 2025-03-05 RX ADMIN — PROPOFOL 100 MG: 10 INJECTION, EMULSION INTRAVENOUS at 12:03

## 2025-03-05 RX ADMIN — GLYCOPYRROLATE 0.2 MG: 0.2 INJECTION, SOLUTION INTRAMUSCULAR; INTRAVENOUS at 12:03

## 2025-03-05 RX ADMIN — LIDOCAINE HYDROCHLORIDE 75 MG: 20 INJECTION INTRAVENOUS at 12:03

## 2025-03-05 RX ADMIN — PROPOFOL 150 MCG/KG/MIN: 10 INJECTION, EMULSION INTRAVENOUS at 12:03

## 2025-03-05 NOTE — H&P
History & Physical - Short Stay  Gastroenterology      SUBJECTIVE:     Procedure: EGD    Chief Complaint/Indication for Procedure: Dysphagia    Facility-Administered Medications Prior to Admission   Medication    EPINEPHrine (EPIPEN) 0.3 mg/0.3 mL pen injection 0.3 mg     PTA Medications   Medication Sig    ergocalciferol, vitamin D2, (VITAMIN D ORAL) Take by mouth Daily.    ibuprofen 200 mg Cap 200 mg as needed.    losartan (COZAAR) 50 MG tablet TAKE 1 TABLET BY MOUTH EVERY DAY    rosuvastatin (CRESTOR) 10 MG tablet TAKE 1 TABLET BY MOUTH EVERY DAY    acyclovir (ZOVIRAX) 400 MG tablet Take 400 mg by mouth Daily.    sildenafiL (VIAGRA) 100 MG tablet Take 1 tablet (100 mg total) by mouth daily as needed for Erectile Dysfunction.       Review of patient's allergies indicates:  No Known Allergies     Past Medical History:   Diagnosis Date    Allergy     Bronchiectasis 02/2018    noted on CT    Cholelithiases     Diverticula of colon     noted on cscope    Fatty liver     noted 2/14    H/O cardiovascular stress test     normal 3/18    History of colon polyps     HSV-2 infection     Hypertension     OCTAVIO (obstructive sleep apnea)     uses CPAP    Prediabetes     Renal calculi     Vitamin D deficiency      Past Surgical History:   Procedure Laterality Date    COLONOSCOPY N/A 3/1/2021    Procedure: COLONOSCOPY;  Surgeon: Byron Kwan Jr., MD;  Location: [facility];  Service: Endoscopy;  Laterality: N/A;    FRACTURE SURGERY      right ankle    VASECTOMY       Family History   Problem Relation Name Age of Onset    Cancer Mother          thyroid cancer    Heart disease Father      Cancer Father          CML    Stroke Father      Stroke Maternal Aunt      Parkinsonism Maternal Uncle      Cancer Maternal Grandmother          breast cancer    Colon cancer Neg Hx      Esophageal cancer Neg Hx      Stomach cancer Neg Hx       Social History[1]      OBJECTIVE:     Vital Signs (Most Recent)  Temp: 97.3 °F (36.3 °C) (03/05/25  1119)  Pulse: 70 (03/05/25 1137)  Resp: 16 (03/05/25 1137)  BP: (!) 163/84 (03/05/25 1137)  SpO2: 98 % (03/05/25 1137)    Physical Exam:                                                       GENERAL:  Comfortable, in no acute distress.                                 HEENT EXAM:  Nonicteric.  No adenopathy.  Oropharynx is clear.               NECK:  Supple.                                                               LUNGS:  Clear.                                                               CARDIAC:  Regular rate and rhythm.  S1, S2.  No murmur.                      ABDOMEN:  Soft, positive bowel sounds, nontender.  No hepatosplenomegaly or masses.  No rebound or guarding.                                             EXTREMITIES:  No edema.     MENTAL STATUS:  Normal, alert and oriented.      ASSESSMENT/PLAN:     Assessment: Dysphagia    Plan: EGD    Anesthesia Plan: General    ASA Grade: ASA 2 - Patient with mild systemic disease with no functional limitations    MALLAMPATI SCORE:  I (soft palate, uvula, fauces, and tonsillar pillars visible)           [1]   Social History  Tobacco Use    Smoking status: Some Days     Types: Cigars    Smokeless tobacco: Never   Substance Use Topics    Alcohol use: Yes     Comment: occ    Drug use: No

## 2025-03-05 NOTE — ANESTHESIA PREPROCEDURE EVALUATION
03/05/2025  Jermaine Henderson is a 69 y.o., male.    Anesthesia Evaluation    I have reviewed the Patient Summary Reports.     I have reviewed the Nursing Notes. I have reviewed the NPO Status.   I have reviewed the Medications.     Review of Systems  Anesthesia Hx:  No problems with previous Anesthesia                Social:  Non-Smoker, Alcohol Use       Cardiovascular:     Hypertension   CAD                                          Pulmonary:        Sleep Apnea, CPAP                Renal/:  Chronic Renal Disease                Hepatic/GI:  Bowel Prep.    Liver Disease,  Dysphagia              Musculoskeletal:  Musculoskeletal Normal                Neurological:  Neurology Normal                                      Endocrine:  Endocrine Normal            Dermatological:  Skin Normal    Psych:  Psychiatric Normal                    Physical Exam  General:  Obesity       Airway/Jaw/Neck:  Airway Findings: Mouth Opening: Normal     Tongue: Normal      General Airway Assessment: Adult      Mallampati: II  Improves to II with phonation.  TM Distance: Normal, at least 6 cm               Dental:  Dental Findings:  Right lower bridge.  May be loose.  Patient unsure   Chest/Lungs:  Chest/Lungs Findings:  Clear to auscultation, Normal Respiratory Rate       Heart/Vascular:  Heart Findings: Rate: Normal  Rhythm: Regular Rhythm                             Anesthesia Plan  Type of Anesthesia, risks & benefits discussed:  Anesthesia Type:  general    Patient's Preference:   Plan Factors:          Intra-op Monitoring Plan: standard ASA monitors  Intra-op Monitoring Plan Comments:   Post Op Pain Control Plan:   Post Op Pain Control Plan Comments:     Induction:   IV  Beta Blocker:  Patient is not currently on a Beta-Blocker (No further documentation required).       Informed Consent: Informed consent signed with the  Patient and all parties understand the risks and agree with anesthesia plan.  All questions answered.  Anesthesia consent signed with patient.  ASA Score: 3     Day of Surgery Review of History & Physical: I have interviewed and examined the patient. I have reviewed the patient's H&P dated: 5/7/14.  There are no significant changes.            Ready For Surgery From Anesthesia Perspective.             Physical Exam  General: Obesity    Airway:  Mallampati: II / II  Mouth Opening: Normal  TM Distance: Normal, at least 6 cm  Tongue: Normal    Chest/Lungs:  Clear to auscultation, Normal Respiratory Rate    Heart:  Rate: Normal  Rhythm: Regular Rhythm        Anesthesia Plan  Type of Anesthesia, risks & benefits discussed:    Anesthesia Type: general  Intra-op Monitoring Plan: standard ASA monitors  Induction:  IV  Informed Consent: Informed consent signed with the Patient and all parties understand the risks and agree with anesthesia plan.  All questions answered.   ASA Score: 3  Day of Surgery Review of History & Physical: I have interviewed and examined the patient. I have reviewed the patient's H&P dated: 5/7/14.     Ready For Surgery From Anesthesia Perspective.     .

## 2025-03-05 NOTE — TRANSFER OF CARE
"Anesthesia Transfer of Care Note    Patient: Jermaine Henderson    Procedure(s) Performed: Procedure(s) (LRB):  EGD (ESOPHAGOGASTRODUODENOSCOPY) (N/A)    Patient location: PACU    Anesthesia Type: general    Transport from OR: Transported from OR on room air with adequate spontaneous ventilation    Post pain: adequate analgesia    Post assessment: no apparent anesthetic complications and tolerated procedure well    Post vital signs: stable    Level of consciousness: sedated    Nausea/Vomiting: no nausea/vomiting    Complications: none    Transfer of care protocol was followed      Last vitals: Visit Vitals  BP (!) 163/84   Pulse 70   Temp 36.3 °C (97.3 °F)   Resp 16   Ht 5' 7" (1.702 m)   Wt 102.1 kg (225 lb)   SpO2 98%   BMI 35.24 kg/m²     "
Patient

## 2025-03-05 NOTE — PROVATION PATIENT INSTRUCTIONS
Discharge Summary/Instructions after an Endoscopic Procedure  Patient Name: Jermaine Henderson  Patient MRN: [id number]  Patient YOB: 1955 Wednesday, March 5, 2025  Murphy Rodriguez DO  Dear patient,  As a result of recent federal legislation (The Federal Cures Act), you may   receive lab or pathology results from your procedure in your [facility]   account before your physician is able to contact you. Your physician or   their representative will relay the results to you with their   recommendations at their soonest availability.  Thank you,  RESTRICTIONS:  During your procedure today, you received medications for sedation.  These   medications may affect your judgment, balance and coordination.  Therefore,   for 24 hours, you have the following restrictions:   - DO NOT drive a car, operate machinery, make legal/financial decisions,   sign important papers or drink alcohol.    ACTIVITY:  Today: no heavy lifting, straining or running due to procedural   sedation/anesthesia.  The following day: return to full activity including work.  DIET:  Eat and drink normally unless instructed otherwise.     TREATMENT FOR COMMON SIDE EFFECTS:  - Mild abdominal pain, nausea, belching, bloating or excessive gas:  rest,   eat lightly and use a heating pad.  - Sore Throat: treat with throat lozenges and/or gargle with warm salt   water.  - Because air was used during the procedure, expelling large amounts of air   from your rectum or belching is normal.  - If a bowel prep was taken, you may not have a bowel movement for 1-3 days.    This is normal.  SYMPTOMS TO WATCH FOR AND REPORT TO YOUR PHYSICIAN:  1. Abdominal pain or bloating, other than gas cramps.  2. Chest pain.  3. Back pain.  4. Signs of infection such as: chills or fever occurring within 24 hours   after the procedure.  5. Rectal bleeding, which would show as bright red, maroon, or black stools.   (A tablespoon of blood from the rectum is not serious,  especially if   hemorrhoids are present.)  6. Vomiting.  7. Weakness or dizziness.  GO DIRECTLY TO THE NEAREST EMERGENCY ROOM IF YOU HAVE ANY OF THE FOLLOWING:      Difficulty breathing              Chills and/or fever over 101 F   Persistent vomiting and/or vomiting blood   Severe abdominal pain   Severe chest pain   Black, tarry stools   Bleeding- more than one tablespoon   Any other symptom or condition that you feel may need urgent attention  Your doctor recommends these additional instructions:  If any biopsies were taken, your doctors clinic will contact you in 1 to 2   weeks with any results.  You have a contact number available for emergencies.  The signs and symptoms   of potential delayed complications were discussed with you.  You may return   to normal activities tomorrow.  Written discharge instructions were   provided to you.   Advance your diet as tolerated.   Continue your present medications.   We are waiting for your pathology results.   Your physician has recommended a repeat upper endoscopy as needed for   retreatment.   Take Protonix (pantoprazole) 40 mg by mouth once a day.   Return to your nurse practitioner as previously scheduled.   You are being discharged to home.  For questions, problems or results please call your physician - Murphy Rodriguez DO at Work:  [phone].  EMERGENCY PHONE NUMBER: [phone], LAB RESULTS: [phone]  IF A COMPLICATION OR EMERGENCY SITUATION ARISES AND YOU ARE UNABLE TO REACH   YOUR PHYSICIAN - GO DIRECTLY TO THE EMERGENCY ROOM.  ___________________________________________  Nurse Signature  ___________________________________________  Patient/Designated Responsible Party Signature  Murphy Rodriguez DO  3/5/2025 12:37:09 PM  This report has been verified and signed electronically.  Dear patient,  As a result of recent federal legislation (The Federal Cures Act), you may   receive lab or pathology results from your procedure in your [facility]    account before your physician is able to contact you. Your physician or   their representative will relay the results to you with their   recommendations at their soonest availability.  Thank you.  PROVATION

## 2025-03-05 NOTE — ANESTHESIA POSTPROCEDURE EVALUATION
Anesthesia Post Evaluation    Patient: Jermaine Henderson    Procedure(s) Performed: Procedure(s) (LRB):  EGD (ESOPHAGOGASTRODUODENOSCOPY) (N/A)    Final Anesthesia Type: general      Patient location during evaluation: PACU  Patient participation: Yes- Able to Participate  Level of consciousness: sedated and awake  Post-procedure vital signs: reviewed and stable  Pain management: adequate  Airway patency: patent    PONV status at discharge: No PONV  Anesthetic complications: no      Cardiovascular status: blood pressure returned to baseline and hemodynamically stable  Respiratory status: spontaneous ventilation  Hydration status: euvolemic  Follow-up not needed.              Vitals Value Taken Time   /65 03/05/25 12:36   Temp  03/05/25 12:45   Pulse 72 03/05/25 12:45   Resp 16 03/05/25 12:45   SpO2 96% 03/05/25 12:45         No case tracking events are documented in the log.      Pain/Lexi Score: Lexi Score: 6 (3/5/2025 12:36 PM)

## 2025-03-07 LAB
FINAL PATHOLOGIC DIAGNOSIS: NORMAL
GROSS: NORMAL
Lab: NORMAL

## 2025-03-25 DIAGNOSIS — Z12.5 ENCOUNTER FOR SCREENING FOR MALIGNANT NEOPLASM OF PROSTATE: ICD-10-CM

## 2025-03-25 DIAGNOSIS — E78.5 HYPERLIPIDEMIA, UNSPECIFIED HYPERLIPIDEMIA TYPE: ICD-10-CM

## 2025-03-25 DIAGNOSIS — R79.9 ABNORMAL FINDING OF BLOOD CHEMISTRY, UNSPECIFIED: ICD-10-CM

## 2025-03-25 DIAGNOSIS — I10 HYPERTENSION, ESSENTIAL: Primary | ICD-10-CM

## 2025-03-25 NOTE — TELEPHONE ENCOUNTER
Care Due:                  Date            Visit Type   Department     Provider  --------------------------------------------------------------------------------                                SAME DAY -                              ESTABLISHED   [facility]    Chrystal Hobson  Last Visit: 07-      PATIENT      MEDICINE       Anger  Next Visit: None Scheduled  None         None Found                                                            Last  Test          Frequency    Reason                     Performed    Due Date  --------------------------------------------------------------------------------    CMP.........  12 months..  losartan, rosuvastatin...  02- 02-    Lipid Panel.  12 months..  rosuvastatin.............  02- 02-    [employer] Embedded Care Due Messages. Reference number: [id number].   3/25/2025 12:26:01 AM CDT

## 2025-03-25 NOTE — TELEPHONE ENCOUNTER
Refill Routing Note   Medication(s) are not appropriate for processing by [facility] for the following reason(s):        Required labs outdated    [facility] action(s):  Defer   Requires labs : Yes             Appointments  past 12m or future 3m with PCP    Date Provider   Last Visit   7/25/2024 Chrystal Hobson MD   Next Visit   Visit date not found Chrystal Hobson MD   ED visits in past 90 days: 0        Note composed:12:44 PM 03/25/2025

## 2025-03-28 ENCOUNTER — TELEPHONE (OUTPATIENT)
Dept: FAMILY MEDICINE | Facility: CLINIC | Age: 70
End: 2025-03-28
Payer: MEDICARE

## 2025-03-28 RX ORDER — LOSARTAN POTASSIUM 50 MG/1
50 TABLET ORAL
Qty: 90 TABLET | Refills: 0 | Status: SHIPPED | OUTPATIENT
Start: 2025-03-28

## 2025-03-28 RX ORDER — ROSUVASTATIN CALCIUM 10 MG/1
10 TABLET, COATED ORAL
Qty: 90 TABLET | Refills: 0 | Status: SHIPPED | OUTPATIENT
Start: 2025-03-28

## 2025-03-28 NOTE — TELEPHONE ENCOUNTER
----- Message from Margaret sent at 3/27/2025  3:51 PM CDT -----  Type: Patient callWho called: PatientDoes the patient know what this is regarding? Requesting a call back in regards to needing to schedule annual appt ; prefer early morning or late afternoon ; please advise Would the patient rather a call back or response via [facility]? [facility]: [phone] information:

## 2025-04-15 RX ORDER — SILDENAFIL 100 MG/1
100 TABLET, FILM COATED ORAL DAILY PRN
Qty: 10 TABLET | Refills: 1 | Status: SHIPPED | OUTPATIENT
Start: 2025-04-15

## 2025-04-15 NOTE — TELEPHONE ENCOUNTER
Refill Decision Note   Jermaine Henderson  is requesting a refill authorization.  Brief Assessment and Rationale for Refill:  Approve     Medication Therapy Plan:         Comments:     Note composed:2:47 PM 04/15/2025

## 2025-04-15 NOTE — TELEPHONE ENCOUNTER
No care due was identified.  [facility] Embedded Care Due Messages. Reference number: [id number].   4/15/2025 2:32:19 PM CDT

## 2025-04-15 NOTE — TELEPHONE ENCOUNTER
----- Message from [place] sent at 4/15/2025  1:01 PM CDT -----  Contact: self  Type:  RX Refill RequestWho Called:  the patientRefill or New Rx:  refillRX Name and Strength:  sildenafiL (VIAGRA) 100 MG tabletHow is the patient currently taking it? (ex. 1XDay):  as directedIs this a 30 day or 90 day RX:  30Preferred Pharmacy with phone number:   [facility] [address]Vynbf: [phone] Fax: [phone] or Mail Order:  localOrdering Provider:  Faviola Call Back Number:  [phone]

## 2025-04-25 ENCOUNTER — TELEPHONE (OUTPATIENT)
Dept: FAMILY MEDICINE | Facility: CLINIC | Age: 70
End: 2025-04-25
Payer: MEDICARE

## 2025-04-25 RX ORDER — ROSUVASTATIN CALCIUM 10 MG/1
TABLET, COATED ORAL
Refills: 0 | OUTPATIENT
Start: 2025-04-25

## 2025-04-25 RX ORDER — SILDENAFIL 100 MG/1
TABLET, FILM COATED ORAL
Refills: 0 | OUTPATIENT
Start: 2025-04-25

## 2025-04-25 RX ORDER — LOSARTAN POTASSIUM 50 MG/1
TABLET ORAL
Refills: 0 | OUTPATIENT
Start: 2025-04-25

## 2025-04-25 RX ORDER — PANTOPRAZOLE SODIUM 40 MG/1
TABLET, DELAYED RELEASE ORAL
Refills: 0 | OUTPATIENT
Start: 2025-04-25

## 2025-04-25 NOTE — TELEPHONE ENCOUNTER
Refill Decision Note   Jermaine Henderson  is requesting a refill authorization.  Brief Assessment and Rationale for Refill:  Quick Discontinue     Medication Therapy Plan:   Pharmacy is requesting new scripts for the following medications without required information, (sig/ frequency/qty/etc)         Comments: Pharmacies have been requesting medications for patients without required information, (sig, frequency, qty, etc.). In addition, requests are sent for medication(s) pt. are currently not taking, and medications patients have never taken.    We have spoken to the pharmacies about these request types and advised their teams previously that we are unable to assess these New Script requests and require all details for these requests. This is a known issue and has been reported.      Note composed:2:01 PM 04/25/2025

## 2025-04-25 NOTE — TELEPHONE ENCOUNTER
No care due was identified.  [facility] Embedded Care Due Messages. Reference number: [id number].   4/25/2025 11:15:41 AM CDT

## 2025-04-25 NOTE — TELEPHONE ENCOUNTER
----- Message from [employer] sent at 4/25/2025 10:24 AM CDT -----  Type: Needs Medical AdviceWho Called:  WilliamSymptoms (please be specific):  How long has patient had these symptoms:  Pharmacy name and phone #:  [facility] Call Back Number: [phone] Information: patient is calling to notify the office that all of [facility] He's recently changed to that pharmacy. Pls advise

## 2025-05-01 ENCOUNTER — OFFICE VISIT (OUTPATIENT)
Dept: FAMILY MEDICINE | Facility: CLINIC | Age: 70
End: 2025-05-01
Payer: MEDICARE

## 2025-05-01 VITALS
RESPIRATION RATE: 18 BRPM | WEIGHT: 225.75 LBS | TEMPERATURE: 99 F | BODY MASS INDEX: 35.43 KG/M2 | DIASTOLIC BLOOD PRESSURE: 94 MMHG | HEART RATE: 74 BPM | SYSTOLIC BLOOD PRESSURE: 142 MMHG | OXYGEN SATURATION: 98 % | HEIGHT: 67 IN

## 2025-05-01 DIAGNOSIS — I10 HYPERTENSION, ESSENTIAL: Primary | ICD-10-CM

## 2025-05-01 DIAGNOSIS — M54.9 BACK PAIN, UNSPECIFIED BACK LOCATION, UNSPECIFIED BACK PAIN LATERALITY, UNSPECIFIED CHRONICITY: ICD-10-CM

## 2025-05-01 DIAGNOSIS — Z13.6 SCREENING FOR AAA (ABDOMINAL AORTIC ANEURYSM): ICD-10-CM

## 2025-05-01 DIAGNOSIS — E78.5 HYPERLIPIDEMIA, UNSPECIFIED HYPERLIPIDEMIA TYPE: ICD-10-CM

## 2025-05-01 DIAGNOSIS — E66.01 SEVERE OBESITY (BMI 35.0-39.9) WITH COMORBIDITY: ICD-10-CM

## 2025-05-01 DIAGNOSIS — I70.0 AORTIC ATHEROSCLEROSIS: ICD-10-CM

## 2025-05-01 PROCEDURE — 99214 OFFICE O/P EST MOD 30 MIN: CPT | Mod: S$GLB,,, | Performed by: FAMILY MEDICINE

## 2025-05-01 PROCEDURE — G2211 COMPLEX E/M VISIT ADD ON: HCPCS | Mod: S$GLB,,, | Performed by: FAMILY MEDICINE

## 2025-05-01 NOTE — PROGRESS NOTES
Subjective:       Patient ID: Jermaine Henderson is a 69 y.o. male.    Chief Complaint: Annual Exam    History of Present Illness    CHIEF COMPLAINT:  Patient presents today for his annual follow-up.  Overall he is doing well except for some stressors and chronic back pain.  He is due for labs and would be willing to have this scheduled.  He is due for AAA ultrasound would be willing to have this scheduled.  He has not been exercising consistently and knows that he needs to get back to this.    HYPERTENSION:  Today's blood pressure is 150/92.  He has a home blood pressure cuff that has been accurate for self-monitoring but he has not been doing this.  He is currently taking Cozaar 50 mg once a day.    HYPERLIPIDEMIA:  He is taking his Crestor consistently.  He is due for fasting lipid profile    MUSCULOSKELETAL:  He reports chronic back pain managed with ibuprofen, taking up to 12 tablets daily.     MENTAL HEALTH:  As noted above, he has been dealing with some stressors.  The stressors involve work and past personal issues.  He has been dealing with these stressors for the past 4 years.  Sometimes he has waves of emotions and anger.  As we talked further, the personal stressors seems to center over past decisions in his life that he regrets.  Despite that, he has a happy home life.  He denies any SI or HI    Of note, since her last visit, he got a clean bill of health from the urologist Dr. Mahan after having a cystoscope performed and has a no further hematuria        Review of Systems   Constitutional:  Negative for appetite change, chills, diaphoresis, fatigue, fever and unexpected weight change.   HENT:  Negative for congestion, dental problem, ear pain, postnasal drip, rhinorrhea, sinus pressure, sneezing, sore throat and trouble swallowing.    Eyes:  Negative for photophobia, pain, discharge and visual disturbance.   Respiratory:  Negative for cough, chest tightness, shortness of breath and wheezing.     Cardiovascular:  Negative for chest pain, palpitations and leg swelling.   Gastrointestinal:  Negative for abdominal distention, abdominal pain, blood in stool, constipation, diarrhea, nausea and vomiting.   Endocrine: Negative for polydipsia and polyuria.   Genitourinary:  Negative for dysuria, flank pain, frequency, genital sores, hematuria, penile discharge and testicular pain.   Musculoskeletal:  Positive for back pain. Negative for arthralgias, joint swelling and myalgias.   Skin:  Negative for rash.   Neurological:  Negative for dizziness, syncope, weakness, light-headedness and headaches.   Hematological:  Negative for adenopathy. Does not bruise/bleed easily.   Psychiatric/Behavioral:  Positive for dysphoric mood. Negative for self-injury, sleep disturbance and suicidal ideas. The patient is nervous/anxious.          Objective:      Physical Exam  Constitutional:       General: He is not in acute distress.     Appearance: Normal appearance. He is well-developed.   HENT:      Head: Normocephalic and atraumatic.      Right Ear: Hearing, tympanic membrane, ear canal and external ear normal.      Left Ear: Hearing, tympanic membrane, ear canal and external ear normal.      Nose: Nose normal.      Mouth/Throat:      Mouth: No oral lesions.      Pharynx: No oropharyngeal exudate or posterior oropharyngeal erythema.   Eyes:      General: Lids are normal. No scleral icterus.     Extraocular Movements: Extraocular movements intact.      Conjunctiva/sclera: Conjunctivae normal.      Pupils: Pupils are equal, round, and reactive to light.   Neck:      Thyroid: No thyroid mass or thyromegaly.      Vascular: No carotid bruit.   Cardiovascular:      Rate and Rhythm: Normal rate and regular rhythm. No extrasystoles are present.     Chest Wall: PMI is not displaced.      Heart sounds: Normal heart sounds. No murmur heard.     No gallop.   Pulmonary:      Effort: Pulmonary effort is normal. No accessory muscle usage or  "respiratory distress.      Breath sounds: Normal breath sounds.   Abdominal:      General: Bowel sounds are normal. There is no abdominal bruit.      Palpations: Abdomen is soft.      Tenderness: There is no abdominal tenderness. There is no rebound.   Musculoskeletal:      Cervical back: Normal range of motion and neck supple.   Lymphadenopathy:      Head:      Right side of head: No submental or submandibular adenopathy.      Left side of head: No submental or submandibular adenopathy.      Cervical:      Right cervical: No superficial, deep or posterior cervical adenopathy.     Left cervical: No superficial, deep or posterior cervical adenopathy.      Upper Body:      Right upper body: No supraclavicular adenopathy.      Left upper body: No supraclavicular adenopathy.   Skin:     General: Skin is warm and dry.   Neurological:      Mental Status: He is alert and oriented to person, place, and time.      Cranial Nerves: No cranial nerve deficit.      Sensory: No sensory deficit.   Psychiatric:         Speech: Speech normal.         Behavior: Behavior normal.         Thought Content: Thought content normal.       Blood pressure (!) 142/94, pulse 74, temperature 98.8 °F (37.1 °C), resp. rate 18, height 5' 7" (1.702 m), weight 102.4 kg (225 lb 12 oz), SpO2 98%.Body mass index is 35.36 kg/m².            Assessment & Plan    1. BP elevated at 150/90.  2. Considered adjusting BP medication due to high reading.  3. Considered work-related stress as potential factor in BP elevation.  4. Assessed use of ibuprofen for back pain, noting potential kidney risk.  5. Recommend switch from ibuprofen to Tylenol for pain management due to age and discontinued ibuprofen.  6. Reviewed recent urological evaluation by Dr. Mahan, which ruled out bladder tumor.  7. Noted need for scheduled labs and abdominal aortic ultrasound.  8. Continued Cozaar.  9. Continued Crestor.  10. Continued Protonix.    SEVERE OBESITY (BMI 35.0-39.9) WITH " COMORBIDITY:  - Assessed the impact of obesity on patient's physical fitness based on reported exhaustion after exercise.  - Patient reports limited physical activity and difficulty with gym visits.  - Recommend continued monitoring of physical activity and possible adjustment of exercise routine to better manage obesity-related challenges.    HYPERTENSION:  - Measured blood pressure at 150/90 today, indicating uncontrolled hypertension.  - discussed increasing Cozaar but patient would like to defer that for now  - Instructed the patient to check blood pressure at home for next 2 weeks and report results.  - Planned to continue Cozaar with potential increase in dosage if blood pressure does not improve.    HYPERLIPIDEMIA:  - Continue Crestor for hyperlipidemia management.  - recheck fasting lipid profile soon    GASTROESOPHAGEAL REFLUX DISEASE:  - Reviewed upper endoscopy results from March showing gastritis and esophagitis.  - Continue Protonix for treatment.    LOW BACK PAIN:  - Patient reports chronic back pain and currently takes 12 ibuprofen daily.  - Due to concerns with long-term NSAID use, advised switching to Tylenol 325 mg, 2 tablets 3 times daily as needed for pain.  - Referred the patient to back and spine team for evaluation of chronic back pain.    MENTAL HEALTH ISSUES:  - he will consider counseling and medication but defer that for now as he felt better after our discussion today    AORTIC ATHEROSCLEROSIS:  - work to modify risk factors to prevent further atherosclerotic progression    HEALTH MAINTENANCE ISSUES:  - we will check labs soon  - we will check AAA ultrasound soon  - resume physical activity and focus on a healthy diet        This note was generated with the assistance of ambient listening technology. Verbal consent was obtained by the patient and accompanying visitor(s) for the recording of patient appointment to facilitate this note. I attest to having reviewed and edited the generated  note for accuracy, though some syntax or spelling errors may persist. Please contact the author of this note for any clarification.

## 2025-05-09 ENCOUNTER — HOSPITAL ENCOUNTER (OUTPATIENT)
Dept: RADIOLOGY | Facility: HOSPITAL | Age: 70
Discharge: HOME | End: 2025-05-09
Attending: FAMILY MEDICINE
Payer: MEDICARE

## 2025-05-09 DIAGNOSIS — Z13.6 SCREENING FOR AAA (ABDOMINAL AORTIC ANEURYSM): ICD-10-CM

## 2025-05-09 PROCEDURE — 76775 US EXAM ABDO BACK WALL LIM: CPT | Mod: TC,PO

## 2025-05-09 PROCEDURE — 76775 US EXAM ABDO BACK WALL LIM: CPT | Mod: 26,,, | Performed by: RADIOLOGY

## 2025-05-11 ENCOUNTER — PATIENT MESSAGE (OUTPATIENT)
Dept: FAMILY MEDICINE | Facility: CLINIC | Age: 70
End: 2025-05-11
Payer: MEDICARE

## 2025-05-11 DIAGNOSIS — R79.89 ELEVATED LFTS: Primary | ICD-10-CM

## 2025-05-11 RX ORDER — ROSUVASTATIN CALCIUM 10 MG/1
10 TABLET, COATED ORAL DAILY
Qty: 90 TABLET | Refills: 3 | Status: SHIPPED | OUTPATIENT
Start: 2025-05-11

## 2025-05-16 ENCOUNTER — CLINICAL SUPPORT (OUTPATIENT)
Dept: FAMILY MEDICINE | Facility: CLINIC | Age: 70
End: 2025-05-16
Payer: MEDICARE

## 2025-05-16 ENCOUNTER — TELEPHONE (OUTPATIENT)
Dept: FAMILY MEDICINE | Facility: CLINIC | Age: 70
End: 2025-05-16

## 2025-05-16 VITALS — SYSTOLIC BLOOD PRESSURE: 147 MMHG | DIASTOLIC BLOOD PRESSURE: 87 MMHG

## 2025-05-16 DIAGNOSIS — I10 HYPERTENSION, ESSENTIAL: Primary | ICD-10-CM

## 2025-05-16 NOTE — TELEPHONE ENCOUNTER
BP: (!) 147/87 (Reading from 5/14) ,       Jermaine Henderson 69 y.o. male was called for his BP readings.   History of HTN yes.     Review of patient's allergies indicates:  No Known Allergies        Creatinine   Date Value Ref Range Status   05/09/2025 0.9 0.5 - 1.4 mg/dL Final            Sodium   Date Value Ref Range Status   05/09/2025 138 136 - 145 mmol/L Final   02/29/2024 138 136 - 145 mmol/L Final            Potassium   Date Value Ref Range Status   05/09/2025 4.1 3.5 - 5.1 mmol/L Final   02/29/2024 4.8 3.5 - 5.1 mmol/L Final   ]  Patient verifies taking blood pressure medications on a regular basis at the same time of the day.   [Current Medications]    [Current Medications]     Current Outpatient Medications:     acyclovir (ZOVIRAX) 400 MG tablet, Take 400 mg by mouth Daily., Disp: , Rfl:     ergocalciferol, vitamin D2, (VITAMIN D ORAL), Take by mouth Daily., Disp: , Rfl:     ibuprofen 200 mg Cap, 200 mg as needed., Disp: , Rfl:     losartan (COZAAR) 50 MG tablet, TAKE 1 TABLET BY MOUTH EVERY DAY, Disp: 90 tablet, Rfl: 0    pantoprazole (PROTONIX) 40 MG tablet, Take 1 tablet (40 mg total) by mouth once daily., Disp: 90 tablet, Rfl: 3    rosuvastatin (CRESTOR) 10 MG tablet, Take 1 tablet (10 mg total) by mouth once daily., Disp: 90 tablet, Rfl: 3    sildenafiL (VIAGRA) 100 MG tablet, Take 1 tablet (100 mg total) by mouth daily as needed for Erectile Dysfunction., Disp: 10 tablet, Rfl: 1     Current Facility-Administered Medications:     EPINEPHrine (EPIPEN) 0.3 mg/0.3 mL pen injection 0.3 mg, 0.3 mg, Intramuscular, PRN, Chrystal Hobson MD  Does patient have record of home blood pressure readings yes. Did not record the HR.   Last dose of blood pressure medication was taken at 6 am.  Patient is asymptomatic.      DATE               BP         5/3                   128/80   5/4                   151/88   5/5                   166/92  5/6                   144/88  5/7                   140/84  5/8                    150/89  5/9                   144/88  5/10                 148/91  5/11                 142/87  5/12                 143/83  5/13                 153/95  5/14                 147/87  5/16     114/74 - pt wanted you to know he was a little hung over this morning which could have affected this readings.

## 2025-05-16 NOTE — PROGRESS NOTES
BP: (!) 147/87 (Reading from 5/14) ,      Jermaine Henderson 69 y.o. male was called for his BP readings.   History of HTN yes.    Review of patient's allergies indicates:  No Known Allergies  Creatinine   Date Value Ref Range Status   05/09/2025 0.9 0.5 - 1.4 mg/dL Final     Sodium   Date Value Ref Range Status   05/09/2025 138 136 - 145 mmol/L Final   02/29/2024 138 136 - 145 mmol/L Final     Potassium   Date Value Ref Range Status   05/09/2025 4.1 3.5 - 5.1 mmol/L Final   02/29/2024 4.8 3.5 - 5.1 mmol/L Final   ]  Patient verifies taking blood pressure medications on a regular basis at the same time of the day.   Current Medications[1]  Does patient have record of home blood pressure readings yes. Did not record the HR.   Last dose of blood pressure medication was taken at 6 am.  Patient is asymptomatic.     DATE  BP   5/3  128/80   5/4  151/88   5/5  166/92  5/6  144/88  5/7  140/84  5/8  150/89  5/9  144/88  5/10  148/91  5/11  142/87  5/12  143/83  5/13  153/95  5/14  147/87  5/16  114/74 - pt wanted you to know he was a little hung over this morning which could have affected this readings.          [1]   Current Outpatient Medications:     acyclovir (ZOVIRAX) 400 MG tablet, Take 400 mg by mouth Daily., Disp: , Rfl:     ergocalciferol, vitamin D2, (VITAMIN D ORAL), Take by mouth Daily., Disp: , Rfl:     ibuprofen 200 mg Cap, 200 mg as needed., Disp: , Rfl:     losartan (COZAAR) 50 MG tablet, TAKE 1 TABLET BY MOUTH EVERY DAY, Disp: 90 tablet, Rfl: 0    pantoprazole (PROTONIX) 40 MG tablet, Take 1 tablet (40 mg total) by mouth once daily., Disp: 90 tablet, Rfl: 3    rosuvastatin (CRESTOR) 10 MG tablet, Take 1 tablet (10 mg total) by mouth once daily., Disp: 90 tablet, Rfl: 3    sildenafiL (VIAGRA) 100 MG tablet, Take 1 tablet (100 mg total) by mouth daily as needed for Erectile Dysfunction., Disp: 10 tablet, Rfl: 1    Current Facility-Administered Medications:     EPINEPHrine (EPIPEN) 0.3 mg/0.3 mL pen injection  0.3 mg, 0.3 mg, Intramuscular, PRN, Chrystal Hobson MD

## 2025-05-17 RX ORDER — LOSARTAN POTASSIUM 100 MG/1
100 TABLET ORAL DAILY
Qty: 30 TABLET | Refills: 0 | Status: SHIPPED | OUTPATIENT
Start: 2025-05-17 | End: 2026-05-17

## 2025-05-18 NOTE — TELEPHONE ENCOUNTER
Pls notify pt:    BP is not well controlled  Let's increase cozaar to 100 mg - new rx to pharmacy  Nurse BP check again in 10 days

## 2025-05-19 NOTE — TELEPHONE ENCOUNTER
Called and reviewed new instructions with pt.  Pt verbalized understanding. Pt is requesting to have his meds filled at [facility].  He is aware that the losartan will not be sent before his nurse visit to ensure that the dose is correct for him.  He also requests that the sildenafil be ordered as 16 tablets for two months as this is preferred by pharmacy.  Pt is also requesting a new prescription of his crestor to be sent there.  Please advise.     Pt also advised that he could not do a nurse visit in ten days, scheduled for 6/4

## 2025-05-20 RX ORDER — ROSUVASTATIN CALCIUM 10 MG/1
10 TABLET, COATED ORAL DAILY
Qty: 90 TABLET | Refills: 3 | Status: SHIPPED | OUTPATIENT
Start: 2025-05-20

## 2025-05-20 RX ORDER — SILDENAFIL 100 MG/1
100 TABLET, FILM COATED ORAL DAILY PRN
Qty: 16 TABLET | Refills: 1 | Status: SHIPPED | OUTPATIENT
Start: 2025-05-20 | End: 2025-07-19

## 2025-06-10 ENCOUNTER — OFFICE VISIT (OUTPATIENT)
Dept: PAIN MEDICINE | Facility: CLINIC | Age: 70
End: 2025-06-10
Payer: MEDICARE

## 2025-06-10 ENCOUNTER — HOSPITAL ENCOUNTER (OUTPATIENT)
Dept: RADIOLOGY | Facility: HOSPITAL | Age: 70
Discharge: HOME | End: 2025-06-10
Attending: ANESTHESIOLOGY
Payer: MEDICARE

## 2025-06-10 VITALS
WEIGHT: 223.56 LBS | DIASTOLIC BLOOD PRESSURE: 76 MMHG | HEART RATE: 83 BPM | BODY MASS INDEX: 35.01 KG/M2 | SYSTOLIC BLOOD PRESSURE: 135 MMHG

## 2025-06-10 DIAGNOSIS — M47.816 LUMBAR SPONDYLOSIS: ICD-10-CM

## 2025-06-10 DIAGNOSIS — M54.9 DORSALGIA: ICD-10-CM

## 2025-06-10 DIAGNOSIS — M54.9 BACK PAIN, UNSPECIFIED BACK LOCATION, UNSPECIFIED BACK PAIN LATERALITY, UNSPECIFIED CHRONICITY: ICD-10-CM

## 2025-06-10 DIAGNOSIS — M54.9 DORSALGIA: Primary | ICD-10-CM

## 2025-06-10 PROCEDURE — 99999 PR PBB SHADOW E&M-EST. PATIENT-LVL IV: CPT | Mod: PBBFAC,,, | Performed by: ANESTHESIOLOGY

## 2025-06-10 PROCEDURE — 72120 X-RAY BEND ONLY L-S SPINE: CPT | Mod: TC,PO

## 2025-06-10 PROCEDURE — 99214 OFFICE O/P EST MOD 30 MIN: CPT | Mod: PBBFAC,PO,25 | Performed by: ANESTHESIOLOGY

## 2025-06-10 PROCEDURE — 72120 X-RAY BEND ONLY L-S SPINE: CPT | Mod: 26,,, | Performed by: RADIOLOGY

## 2025-06-10 NOTE — PROGRESS NOTES
[facility] New Patient Evaluation      Referred by: Dr. Chrystal Hobson    PCP:     CC:   Chief Complaint   Patient presents with    Back Pain          6/10/2025     8:15 AM   Last 3 PDI Scores   Pain Disability Index (PDI) 28         HPI:   Jermaine Henderson is a 69 y.o. male patient who has a past medical history of Allergy, Bronchiectasis, Cholelithiases, Diverticula of colon, Esophagitis, Fatty liver, Gastritis, H/O cardiovascular stress test, History of colon polyps, HSV-2 infection, Hypertension, OCTAVIO (obstructive sleep apnea), Prediabetes, Renal calculi, and Vitamin D deficiency. He presents with lower back pain ongoing for approximately two years. The onset is attributed to a slip and fall incident on home steps, where he landed directly on the back. Initially, there was no immediate discomfort, but over a few months, increasing pain developed.  The pain is localized to the lower back without radiation to the legs. He reports significant difficulty with certain movements, particularly transitioning from lying down to standing up and vice versa. Getting into and out of bed takes a few minutes and requires shifting the body to minimize pain. He experienced embarrassment at the gym a few months ago when it took a minute to lie down on a bench and about three minutes to get up. While able to lift and carry things, pain is most pronounced when attempting to lie down or get up from bed.  For pain management, he takes ibuprofen, which is found effective. Previous XRs revealed a retrolisthesis condition.    Pain Intervention History:      Past Spine Surgical History:      Past and current medications:  Antineuropathics:  NSAIDs: ibuprofen   Physical therapy:  Antidepressants:  Muscle relaxers:  Opioids:  Antiplatelets/Anticoagulants:    History:  Current Medications[1]    Past Medical History:   Diagnosis Date    Allergy     Bronchiectasis 02/2018    noted on CT    Cholelithiases     Diverticula of colon      noted on cscope    Esophagitis     egd 3/25    Fatty liver     noted 2/14    Gastritis     3/25 egd    H/O cardiovascular stress test     normal 3/18    History of colon polyps     HSV-2 infection     Hypertension     OCTAVIO (obstructive sleep apnea)     uses CPAP    Prediabetes     Renal calculi     Vitamin D deficiency        Past Surgical History:   Procedure Laterality Date    COLONOSCOPY N/A 3/1/2021    Procedure: COLONOSCOPY;  Surgeon: Byron Kwan Jr., MD;  Location: [facility] ENDO;  Service: Endoscopy;  Laterality: N/A;    ESOPHAGOGASTRODUODENOSCOPY N/A 3/5/2025    Procedure: EGD (ESOPHAGOGASTRODUODENOSCOPY);  Surgeon: Murphy Rodriguez DO;  Location: [facility] ENDO;  Service: Endoscopy;  Laterality: N/A;    FRACTURE SURGERY      right ankle    VASECTOMY         Family History   Problem Relation Name Age of Onset    Cancer Mother          thyroid cancer    Heart disease Father      Cancer Father          CML    Stroke Father      Stroke Maternal Aunt      Parkinsonism Maternal Uncle      Cancer Maternal Grandmother          breast cancer    Colon cancer Neg Hx      Esophageal cancer Neg Hx      Stomach cancer Neg Hx         Social History     Socioeconomic History    Marital status:    Occupational History    Occupation:  coordnator for [employer]     Employer: [employer]     Employer: [employer]   Tobacco Use    Smoking status: Some Days     Types: Cigars    Smokeless tobacco: Never   Substance and Sexual Activity    Alcohol use: Yes     Comment: occ    Drug use: No    Sexual activity: Yes     Partners: Female     Birth control/protection: Surgical     Social Drivers of Health     Financial Resource Strain: Medium Risk (4/4/2025)    Overall Financial Resource Strain (CARDIA)     Difficulty of Paying Living Expenses: Somewhat hard   Food Insecurity: No Food Insecurity (4/4/2025)    Hunger Vital Sign     Worried About Running Out of Food in the Last Year: Never true      Ran Out of Food in the Last Year: Never true   Transportation Needs: No Transportation Needs (4/4/2025)    PRAPARE - Transportation     Lack of Transportation (Medical): No     Lack of Transportation (Non-Medical): No   Physical Activity: Sufficiently Active (4/4/2025)    Exercise Vital Sign     Days of Exercise per Week: 3 days     Minutes of Exercise per Session: 60 min   Stress: Stress Concern Present (4/4/2025)    [employer] [place] [employer] - Occupational Stress Questionnaire     Feeling of Stress : To some extent   Housing Stability: Low Risk  (4/4/2025)    Housing Stability Vital Sign     Unable to Pay for Housing in the Last Year: No     Homeless in the Last Year: No       Review of patient's allergies indicates:  No Known Allergies    Review of Systems:  12 point review of systems is negative.    Physical Exam:  Vitals:    06/10/25 0816   BP: 135/76   Pulse: 83   Weight: 101.4 kg (223 lb 8.7 oz)   PainSc:   3   PainLoc: Back     Body mass index is 35.01 kg/m².    Gen: NAD  Psych: mood appropriate for given condition  HEENT: eyes anicteric   CV: RRR  HEENT: anicteric   Respiratory: non-labored, no signs of respiratory distress  Abd: non-distended  Skin: warm, dry and intact.  Gait: No antalgic gait.     Reproducible pain with lumbar axial facet loading    Sensory:  Intact and symmetrical to light touch in L1-S1 dermatomes bilaterally.    Motor:     Right Left   L2/3 Iliacus Hip flexion  5  5   L3/4 Qudratus Femoris Knee Extension  5  5   L4/5 Tib Anterior Ankle Dorsiflexion   5  5   L5/S1 Extensor Hallicus Longus Great toe extension  5  5   S1/S2 Gastroc/Soleus Plantar Flexion  5  5       Labs:  Lab Results   Component Value Date    HGBA1C 5.8 (H) 05/09/2025       Lab Results   Component Value Date    WBC 5.12 05/09/2025    HGB 13.5 (L) 05/09/2025    HCT 41.2 05/09/2025    MCV 93 05/09/2025     05/09/2025           Imaging:  Xray lumbar spine 7/25/24  FINDINGS:  Bone density appears  normal.  The vertebral bodies maintain normal height.  There is no fracture.  There is trace retrolisthesis of L1 on L2, L2 on L3, L3 on L4.  There is moderate lower lumbar facet arthropathy.  There is mild multilevel endplate osteophyte formation.  The paraspinous soft tissues are grossly normal.      Diagnosis:  1. Dorsalgia  -     X-Ray Lumbar Spine Flexion And Extension Only; Future; Expected date: 06/10/2025  -     Ambulatory Referral/Consult to Physical Therapy    2. Back pain, unspecified back location, unspecified back pain laterality, unspecified chronicity  -     Ambulatory referral/consult to Spine Care    3. Lumbar spondylosis  -     Ambulatory Referral/Consult to Physical Therapy        Jermaine Henderson is a 69 y.o. male patient who has a past medical history of Allergy, Bronchiectasis, Cholelithiases, Diverticula of colon, Esophagitis, Fatty liver, Gastritis, H/O cardiovascular stress test, History of colon polyps, HSV-2 infection, Hypertension, OCTAVIO (obstructive sleep apnea), Prediabetes, Renal calculi, and Vitamin D deficiency. He presents with lower back pain ongoing for approximately two years. The onset is attributed to a slip and fall incident on home steps, where he landed directly on the back. Initially, there was no immediate discomfort, but over a few months, increasing pain developed.  The pain is localized to the lower back without radiation to the legs. He reports significant difficulty with certain movements, particularly transitioning from lying down to standing up and vice versa. Getting into and out of bed takes a few minutes and requires shifting the body to minimize pain. He experienced embarrassment at the gym a few months ago when it took a minute to lie down on a bench and about three minutes to get up. While able to lift and carry things, pain is most pronounced when attempting to lie down or get up from bed.  For pain management, he takes ibuprofen, which is found effective.  Previous XRs revealed a retrolisthesis condition.    - on exam he has reproducible pain with lumbar axial facet loading  - I independently reviewed his lumbar x-ray and he has trace listhesis of L1 on L2, L2 on L3, L3 on L4.  He has multilevel bilateral facet arthropathy  - I am going to order an x-ray today with flexion-extension to rule out any instability of his listhesis  - I suspect his pain is primarily related to lumbar spondylosis  - he uses ibuprofen with a about 80-90% relief of his pain.  I discussed treatment options including diagnostic injections as well as physical therapy.  At this time we are going to try to maximize conservative treatment.  I have placed a referral for him to start physical therapy at [facility] in [place] close to where he lives  - he will follow up in 2 months.  If he fails to find improvement and feels like his axial lower back pain limits his quality of life further we can consider diagnostic lumbar medial branch blocks      : Not applicable    Zohaib Ceballos M.D.  Interventional Pain Medicine / Anesthesiology    This note was completed with dictation software and grammatical errors may exist.         [1]   Current Outpatient Medications:     acyclovir (ZOVIRAX) 400 MG tablet, Take 400 mg by mouth Daily., Disp: , Rfl:     ergocalciferol, vitamin D2, (VITAMIN D ORAL), Take by mouth Daily., Disp: , Rfl:     ibuprofen 200 mg Cap, 200 mg as needed., Disp: , Rfl:     losartan (COZAAR) 100 MG tablet, Take 1 tablet (100 mg total) by mouth once daily., Disp: 30 tablet, Rfl: 0    pantoprazole (PROTONIX) 40 MG tablet, Take 1 tablet (40 mg total) by mouth once daily., Disp: 90 tablet, Rfl: 3    rosuvastatin (CRESTOR) 10 MG tablet, Take 1 tablet (10 mg total) by mouth once daily., Disp: 90 tablet, Rfl: 3    sildenafiL (VIAGRA) 100 MG tablet, Take 1 tablet (100 mg total) by mouth daily as needed for Erectile Dysfunction., Disp: 16 tablet, Rfl: 1    Current Facility-Administered  Medications:     EPINEPHrine (EPIPEN) 0.3 mg/0.3 mL pen injection 0.3 mg, 0.3 mg, Intramuscular, PRN, Chrystal Hobson MD

## 2025-06-12 NOTE — TELEPHONE ENCOUNTER
No care due was identified.  [facility] Embedded Care Due Messages. Reference number: [id number].   6/12/2025 11:30:50 AM CDT

## 2025-06-12 NOTE — TELEPHONE ENCOUNTER
Refill Routing Note   Medication(s) are not appropriate for processing by [facility] for the following reason(s):        New or recently adjusted medication    [facility] action(s):  Defer             Appointments  past 12m or future 3m with PCP    Date Provider   Last Visit   5/1/2025 Chrystal Hobson MD   Next Visit   Visit date not found Chrystal Hobson MD   ED visits in past 90 days: 0        Note composed:3:15 PM 06/12/2025

## 2025-06-15 RX ORDER — LOSARTAN POTASSIUM 100 MG/1
100 TABLET ORAL
Qty: 90 TABLET | Refills: 1 | Status: SHIPPED | OUTPATIENT
Start: 2025-06-15

## 2025-06-24 ENCOUNTER — CLINICAL SUPPORT (OUTPATIENT)
Dept: REHABILITATION | Facility: HOSPITAL | Age: 70
End: 2025-06-24
Attending: ANESTHESIOLOGY
Payer: MEDICARE

## 2025-06-24 DIAGNOSIS — M54.50 CHRONIC RIGHT-SIDED LOW BACK PAIN WITHOUT SCIATICA: Primary | ICD-10-CM

## 2025-06-24 DIAGNOSIS — G89.29 CHRONIC RIGHT-SIDED LOW BACK PAIN WITHOUT SCIATICA: Primary | ICD-10-CM

## 2025-06-24 DIAGNOSIS — M25.60 DECREASED RANGE OF MOTION: ICD-10-CM

## 2025-06-24 DIAGNOSIS — M62.81 MUSCLE WEAKNESS: ICD-10-CM

## 2025-06-24 PROCEDURE — 97112 NEUROMUSCULAR REEDUCATION: CPT | Mod: PN | Performed by: PHYSICAL THERAPIST

## 2025-06-24 PROCEDURE — 97110 THERAPEUTIC EXERCISES: CPT | Mod: PN | Performed by: PHYSICAL THERAPIST

## 2025-06-24 PROCEDURE — 97162 PT EVAL MOD COMPLEX 30 MIN: CPT | Mod: PN | Performed by: PHYSICAL THERAPIST

## 2025-06-24 NOTE — PROGRESS NOTES
Outpatient Rehab    Physical Therapy Evaluation    Patient Name: Doron Henderson  MRN: [id number]  YOB: 1955  Encounter Date: 6/24/2025    Therapy Diagnosis:   Encounter Diagnoses   Name Primary?    Chronic right-sided low back pain without sciatica Yes    Muscle weakness     Decreased range of motion      Physician: Zohaib Ceballos MD    Physician Orders: Eval and Treat  Medical Diagnosis: Dorsalgia  Lumbar spondylosis  Surgical Diagnosis: Not applicable for this Episode   Surgical Date: Not applicable for this Episode  Days Since Last Surgery: Not applicable for this Episode    Visit # / Visits Authorized:  1 / 1  Insurance Authorization Period: 6/10/2025 to 6/10/2026  Date of Evaluation: 6/24/2025  Plan of Care Certification: 6/24/2025 to 8-18-25     Time In: 0705   Time Out: 0804  Total Time (in minutes): 59   Total Billable Time (in minutes): 59    Intake Outcome Measure for FOTO Survey    Therapist reviewed FOTO scores for Doron Henderson on 6/24/2025.   FOTO report - see Media section or FOTO account episode details.     Intake Score: 60%    Precautions:     Abdominal hernia and standard      Subjective   History of Present Illness  Doron is a 69 y.o. male      The patient reports a medical diagnosis of M54.9 (ICD-10-CM) - Dorsalgia  M47.816 (ICD-10-CM) - Lumbar spondylosis.    Diagnostic tests related to this condition: X-ray.   X-Ray Details: There is no recent study for comparison.  As seen on the prior study, there is multilevel endplate osteophyte formation and mid to lower facet joint arthropathy.  There is no fracture.  There is mild retrolisthesis of L2 on L3, L3 on L4 without measurable change on flexion or extension.  There is mild multilevel disc space narrowing.    Dominant Hand: Either/ambidextrous  History of Present Condition/Illness: Pt states about 18-24 months ago had a fall slipping on wet steps landing on L buttock and hit L LB hurt at moment, but did not become chronic until a  couple of months later, tried just taking ibuprofen and helped some Pt states pain is most notable when lying down and then get back Pt states going into supine and then supine to sit hurts Pt noticed pain in bed and when trying to work out at gym and lying on bench  Pt states then 1 year ago went to Dr Hobson and had kidney stones previously  Pt states he had that checked out and was negative Pt states he saw Dr Ceballos recently to come to PT Pt denies previous hx of LBP    Activities of Daily Living  Social history was obtained from Patient.    General Prior Level of Function Comments: no limitations  General Current Level of Function Comments: sit to supine and supine to sit transfers, prolonged sitting, driving over 1 hour, prolonged sitting at computer for work       Previously independent with activities of daily living? Yes     Currently independent with activities of daily living? Yes          Previously independent with instrumental activities of daily living? Yes     Currently independent with instrumental activities of daily living? Yes              Pain     Patient reports a current pain level of 0/10. Pain at best is reported as 0/10. Pain at worst is reported as 7/10.   Location: R side of LB QL region denies LE involvement  Pt reports pain can radiate up at times  Clinical Progression (since onset): Unchanged  Pain Qualities: Sharp  Pain-Relieving Factors: Lying down, Change in position  Pain-Aggravating Factors: Transfers         Treatment History  Treatments  Previously Received Treatments: No  Currently Receiving Treatments: No    Living Arrangements  Living Situation  Housing: Home independently  Living Arrangements: Spouse/significant other    Home Setup  Number of Levels in Home: Two level        Employment  Employment Status: Employed full-time    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    Jermaine Henderson  has a past surgical history that includes Vasectomy; Fracture surgery; Colonoscopy (N/A, 3/1/2021); and Esophagogastroduodenoscopy (N/A, 3/5/2025).    Jermaine has a current medication list which includes the following prescription(s): acyclovir, ergocalciferol (vitamin d2), ibuprofen, losartan, pantoprazole, rosuvastatin, and sildenafil, and the following Facility-Administered Medications: epinephrine.    Review of patient's allergies indicates:  No Known Allergies     Objective   Posture                 Forward head, rounded  and  IR shoulders, R shoulder high, R hip high,  increased lower thoracic kyphosis, flattened  lumbar lordosis , slight increased ER R      Lower Extremity Sensation  Right Lumbar/Lower Extremity Sensation  Intact: Light Touch       Left Lumbar/Lower Extremity Sensation  Intact: Light Touch                Spinal Mobility  Hypomobile: Thoracic and Lumbosacral  Lumbosacral Mobility Details: Pelvic positioning: AL L          Lumbar Range of Motion   Lumbar active range of motion in standing is: - flexion - prox 1/3 calf    little pain LB                 - extension -  50%                        - left side bending -  1" above knee pain on R        - right side bending -  1" above knee pain on R              Hip Range of Motion    Flexibility testing: hamstrings:     90/90 test R 55 L 45                     Hip Strength - Planes of Motion   Right Strength Right Pain Left Strength Left  Pain   Flexion (L2) 4   4+     Extension 4-   4-     ABduction 4+   5     ADduction           Internal Rotation           External Rotation               Hip Strength - Isolated Muscles   Right Strength " Right Pain Left Strength Left  Pain   Gluteus Sergio 3+   4-     Gluteus Medius           Gluteus Minimus           Tensor Fasciae Latae             Knee Strength   Right Strength Right Pain Left Strength Left  Pain   Flexion (S2) 5   5     Prone Flexion           Extension (L3) 5   5                   Gait Analysis  Gait Analysis Details  No gait deficits noted except slight increased ER R LE   Able to toe and heel walk           Treatment:  Therapeutic Exercise  TE 1: Hamstring stretch supine x 10  TE 2: Piriformis stretch supine x 10  TE 10: Press up x 10  Balance/Neuromuscular Re-Education  NMR 1: Pt with pelvic dysfunction.  Performed push/push with PT and able to note positive change in symptoms.  Instructed pt in increased awareness of symptoms.  Instructed pt in need to perform push/pull at onset of increased symptoms.  Instructed pt in spinal anatomy  NMR 2: glut sets  x 10  NMR 3: HOLD for now Hip abduction SL x 10  NMR 4: Push/push x 3    Time Entry(in minutes):  PT Evaluation (Moderate) Time Entry: 34  Neuromuscular Re-Education Time Entry: 15  Therapeutic Exercise Time Entry: 10    Assessment & Plan   Assessment  Bill presents with a condition of Moderate complexity.   Presentation of Symptoms: Evolving  Will Comorbidities Impact Care: Yes  Age and postural deficits of thoracic and lumbar spine     Functional Limitations: Driving, Bed mobility, Transfers, Completing work/school activities, Sitting tolerance  Impairments: Abnormal or restricted range of motion, Impaired physical strength    Patient Goal for Therapy (PT): get rid of pain  Prognosis: Good  Assessment Details: Pt noted to have pelvic dysfunction which responded well to MET techniques and pt felt more loose after session Pt continued with pain with transfers     Plan  From a physical therapy perspective, the patient would benefit from: Skilled Rehab Services    Planned therapy interventions include: Therapeutic exercise, Therapeutic  activities, Neuromuscular re-education, Manual therapy, and ADLs/IADLs.    Planned modalities to include: Cryotherapy (cold pack), Thermotherapy (hot pack), Electrical stimulation - attended, Electrical stimulation - passive/unattended, Ultrasound, and Other (Comment). Dry Needling      Visit Frequency: 2 times Per Week for 8 Weeks.       This plan was discussed with Patient.   Discussion participants: Agreed Upon Plan of Care             The patient's spiritual, cultural, and educational needs were considered, and the patient is agreeable to the plan of care and goals.           Goals:   Active       Long term goals       Increase range of motion to 75%to 100% full        Start:  06/24/25    Expected End:  08/19/25            Increase strength 1 muscle grade '       Start:  06/24/25    Expected End:  08/19/25            Improve and stabilize proper pelvic positioning         Start:  06/24/25    Expected End:  08/19/25            Restore ability to perform sit to supine and supine to sit transfer without increased pain       Start:  06/24/25    Expected End:  08/19/25            Restore ability to sit for ADL without increased pain        Start:  06/24/25    Expected End:  08/19/25            Restore ability to drive without increased pain or difficulty         Start:  06/24/25    Expected End:  08/19/25            Restore ability to sit for work without increased pain       Start:  06/24/25    Expected End:  08/19/25            Restore ability to perform ADL's and household activities independently and without increased pain         Start:  06/24/25    Expected End:  08/19/25            Pt to be independent with HEP to improve mobility, strength, and endurance to be independent with ADL's         Start:  06/24/25    Expected End:  08/19/25               Short term goals        Increase range of motion 25%        Start:  06/24/25    Expected End:  07/22/25            Increase strength 1/2 muscle grade        Start:   06/24/25    Expected End:  07/22/25            Improve postural awareness of pelvis to independently identify dysfunction with min assist from PT         Start:  06/24/25    Expected End:  07/22/25            Be able to perform HEP with minimal cueing required         Start:  06/24/25    Expected End:  07/22/25                Chio Joiner, PT

## 2025-06-25 ENCOUNTER — TELEPHONE (OUTPATIENT)
Dept: FAMILY MEDICINE | Facility: CLINIC | Age: 70
End: 2025-06-25
Payer: MEDICARE

## 2025-06-25 NOTE — TELEPHONE ENCOUNTER
Copied from CRM #[id number]. Topic: Appointments - Appointment Access  >> Jun 25, 2025  9:32 AM Pam wrote:  Type: Needs Medical Advice         Who Called: pt  Best Call Back Number:[phone]  Additional Information: Requesting a call pt has a nurse visit on  07/01 and asking if it can be a audio visit.  back regarding  Please Advise- Thank you

## 2025-07-01 ENCOUNTER — CLINICAL SUPPORT (OUTPATIENT)
Dept: FAMILY MEDICINE | Facility: CLINIC | Age: 70
End: 2025-07-01
Payer: MEDICARE

## 2025-07-01 VITALS — SYSTOLIC BLOOD PRESSURE: 134 MMHG | HEART RATE: 81 BPM | DIASTOLIC BLOOD PRESSURE: 78 MMHG

## 2025-07-01 DIAGNOSIS — I10 HYPERTENSION, UNSPECIFIED TYPE: Primary | ICD-10-CM

## 2025-07-01 NOTE — PROGRESS NOTES
Phone BP check. Pt takes Losartan 100 mg in the am .       ,   HOME READINGS     6/15  /72   Pulse 87  6/22  BP  127/71  Pulse 82  6/23  / 79    Pulse 82  6/25  /82  Pulse 86  6/27  /93  Pulse 88  6/29  /85  Pulse 81   6/30  /82  Pulse 72             Blood pressure reading  today  was 134/78, Pulse 81.  Dr. Hobson notified.    Jermaine GRACIA Henderson 69 y.o. male is here today for Blood Pressure check.   History of HTN yes.    Review of patient's allergies indicates:  No Known Allergies  Creatinine   Date Value Ref Range Status   05/09/2025 0.9 0.5 - 1.4 mg/dL Final     Sodium   Date Value Ref Range Status   05/09/2025 138 136 - 145 mmol/L Final   02/29/2024 138 136 - 145 mmol/L Final     Potassium   Date Value Ref Range Status   05/09/2025 4.1 3.5 - 5.1 mmol/L Final   02/29/2024 4.8 3.5 - 5.1 mmol/L Final   ]  Patient verifies taking blood pressure medications on a regular basis at the same time of the day.   Current Medications[1]  Does patient have record of home blood pressure readings yes.   Last dose of blood pressure medication was taken at  6:00 am this am .  Patient is asymptomatic.              [1]   Current Outpatient Medications:     acyclovir (ZOVIRAX) 400 MG tablet, Take 400 mg by mouth Daily., Disp: , Rfl:     ergocalciferol, vitamin D2, (VITAMIN D ORAL), Take by mouth Daily., Disp: , Rfl:     ibuprofen 200 mg Cap, 200 mg as needed., Disp: , Rfl:     losartan (COZAAR) 100 MG tablet, TAKE 1 TABLET BY MOUTH EVERY DAY, Disp: 90 tablet, Rfl: 1    pantoprazole (PROTONIX) 40 MG tablet, Take 1 tablet (40 mg total) by mouth once daily., Disp: 90 tablet, Rfl: 3    rosuvastatin (CRESTOR) 10 MG tablet, Take 1 tablet (10 mg total) by mouth once daily., Disp: 90 tablet, Rfl: 3    sildenafiL (VIAGRA) 100 MG tablet, Take 1 tablet (100 mg total) by mouth daily as needed for Erectile Dysfunction., Disp: 16 tablet, Rfl: 1    Current Facility-Administered Medications:     EPINEPHrine  (EPIPEN) 0.3 mg/0.3 mL pen injection 0.3 mg, 0.3 mg, Intramuscular, PRN, Chrystal Hobson MD

## 2025-07-02 ENCOUNTER — CLINICAL SUPPORT (OUTPATIENT)
Dept: REHABILITATION | Facility: HOSPITAL | Age: 70
End: 2025-07-02
Payer: MEDICARE

## 2025-07-02 DIAGNOSIS — G89.29 CHRONIC RIGHT-SIDED LOW BACK PAIN WITHOUT SCIATICA: Primary | ICD-10-CM

## 2025-07-02 DIAGNOSIS — M62.81 MUSCLE WEAKNESS: ICD-10-CM

## 2025-07-02 DIAGNOSIS — M25.60 DECREASED RANGE OF MOTION: ICD-10-CM

## 2025-07-02 DIAGNOSIS — M54.50 CHRONIC RIGHT-SIDED LOW BACK PAIN WITHOUT SCIATICA: Primary | ICD-10-CM

## 2025-07-02 PROCEDURE — 97110 THERAPEUTIC EXERCISES: CPT | Mod: PN | Performed by: PHYSICAL THERAPIST

## 2025-07-02 PROCEDURE — 97112 NEUROMUSCULAR REEDUCATION: CPT | Mod: PN | Performed by: PHYSICAL THERAPIST

## 2025-07-02 NOTE — PROGRESS NOTES
Outpatient Rehab    Physical Therapy Visit    Patient Name: Doron Henderson  MRN: [id number]  YOB: 1955  Encounter Date: 7/2/2025    Therapy Diagnosis:   Encounter Diagnoses   Name Primary?    Chronic right-sided low back pain without sciatica Yes    Decreased range of motion     Muscle weakness      Physician: Zohaib Ceballos MD    Physician Orders: Eval and Treat  Medical Diagnosis: Dorsalgia  Lumbar spondylosis  Surgical Diagnosis: Not applicable for this Episode   Surgical Date: Not applicable for this Episode  Days Since Last Surgery: Not applicable for this Episode    Visit # / Visits Authorized:  1 / 10  Insurance Authorization Period: 6/23/2025 to 12/31/2025  Date of Evaluation: 6/24/2025  Plan of Care Certification: 6/24/2025 to 8/19/2025      PT/PTA: PT   Number of PTA visits since last PT visit:0  Time In: 0701   Time Out: 0753  Total Time (in minutes): 52   Total Billable Time (in minutes): 30    FOTO:  Intake Score: 60%  Survey Score 2:  %  Survey Score 3:  %    Precautions:     Abdominal hernia and standard      Subjective   Pt states feeling a little sore he has been doing his workout at gym and PT ex  Pt states a little sore upon awakening and as moved around got better.  Pain reported as 0/10. R side of LB was area of soreness this AM  at end of session 0/10    Objective      Spinal Mobility  Lumbosacral Mobility Details: Pelvic positioning: level at IE CO L             Treatment:  Therapeutic Exercise  TE 1: Hamstring stretch supine x 10  TE 2: Piriformis stretch supine x 10  TE 3: + LTR x 2/10  TE 10: Press up x 10  Manual Therapy  MT 1: P/A mob to lumbar spine mod tightness noted with spring  Balance/Neuromuscular Re-Education  NMR 1: Level pelvis  NMR 2: +bridge x 10  NMR 3: + Hip abduction SL x 10  NMR 5: + SLR x 10  NMR 6: + pelvic tilt x 2/10  NMR 7: + hip ext prone x 10  Instructed pt to consider pillow under stomach if increased pain noted after ex  Therapeutic Activity  TA 1:  Discussion of soreness in AM and discussed bed positioning and instructed pt to try pillow under knees to soften knees during sleep and note response    Time Entry(in minutes):  Manual Therapy Time Entry: 8  Neuromuscular Re-Education Time Entry: 10  Therapeutic Activity Time Entry: 2  Therapeutic Exercise Time Entry: 10    Assessment & Plan   Assessment: Pt with stiffness and some pain upon arising in AM and to make adjustment to sleep position and to note response.  Pt to continue to watch workout program for increased pain and modify as needed.  Pt with level pelvis to start, did develop dysfunction at end with increased symptoms which were resolved with push/push ex  Pt with improved lumbar mobility from IE Pt able to progress with strengthening and stabilization without adverse effect     Evaluation/Treatment Tolerance: Patient tolerated treatment well    The patient will continue to benefit from skilled outpatient physical therapy in order to address the deficits listed in the problem list on the initial evaluation, provide patient and family education, and maximize the patients level of independence in the home and community environments.     The patient's spiritual, cultural, and educational needs were considered, and the patient is agreeable to the plan of care and goals.           Plan: Continue with treatment as tolerated as per POC.  Progress strength and stabilization as tolerated    Goals:   Active       Long term goals       Increase range of motion to 75%to 100% full  (Progressing)       Start:  06/24/25    Expected End:  08/19/25            Increase strength 1 muscle grade ' (Progressing)       Start:  06/24/25    Expected End:  08/19/25            Improve and stabilize proper pelvic positioning   (Progressing)       Start:  06/24/25    Expected End:  08/19/25            Restore ability to perform sit to supine and supine to sit transfer without increased pain (Progressing)       Start:  06/24/25     Expected End:  08/19/25            Restore ability to sit for ADL without increased pain  (Progressing)       Start:  06/24/25    Expected End:  08/19/25            Restore ability to drive without increased pain or difficulty   (Progressing)       Start:  06/24/25    Expected End:  08/19/25            Restore ability to sit for work without increased pain (Progressing)       Start:  06/24/25    Expected End:  08/19/25            Restore ability to perform ADL's and household activities independently and without increased pain   (Progressing)       Start:  06/24/25    Expected End:  08/19/25            Pt to be independent with HEP to improve mobility, strength, and endurance to be independent with ADL's   (Progressing)       Start:  06/24/25    Expected End:  08/19/25               Short term goals        Increase range of motion 25%  (Progressing)       Start:  06/24/25    Expected End:  07/22/25            Increase strength 1/2 muscle grade  (Progressing)       Start:  06/24/25    Expected End:  07/22/25            Improve postural awareness of pelvis to independently identify dysfunction with min assist from PT   (Progressing)       Start:  06/24/25    Expected End:  07/22/25            Be able to perform HEP with minimal cueing required   (Progressing)       Start:  06/24/25    Expected End:  07/22/25                Chio Joiner, PT

## 2025-07-05 ENCOUNTER — TELEPHONE (OUTPATIENT)
Dept: FAMILY MEDICINE | Facility: CLINIC | Age: 70
End: 2025-07-05
Payer: MEDICARE

## 2025-07-05 DIAGNOSIS — I10 HYPERTENSION, ESSENTIAL: Primary | ICD-10-CM

## 2025-07-05 NOTE — TELEPHONE ENCOUNTER
Pls notify pt:  BPs look better with higher dose of cozaar    Let's continue with this    Notify if BPs consistently higher than 130/85    Let's check a BMP this week

## 2025-07-07 ENCOUNTER — CLINICAL SUPPORT (OUTPATIENT)
Dept: REHABILITATION | Facility: HOSPITAL | Age: 70
End: 2025-07-07
Payer: MEDICARE

## 2025-07-07 DIAGNOSIS — M54.50 CHRONIC RIGHT-SIDED LOW BACK PAIN WITHOUT SCIATICA: Primary | ICD-10-CM

## 2025-07-07 DIAGNOSIS — M25.60 DECREASED RANGE OF MOTION: ICD-10-CM

## 2025-07-07 DIAGNOSIS — M62.81 MUSCLE WEAKNESS: ICD-10-CM

## 2025-07-07 DIAGNOSIS — G89.29 CHRONIC RIGHT-SIDED LOW BACK PAIN WITHOUT SCIATICA: Primary | ICD-10-CM

## 2025-07-07 PROCEDURE — 97110 THERAPEUTIC EXERCISES: CPT | Mod: PN | Performed by: PHYSICAL THERAPIST

## 2025-07-07 PROCEDURE — 97112 NEUROMUSCULAR REEDUCATION: CPT | Mod: PN | Performed by: PHYSICAL THERAPIST

## 2025-07-07 PROCEDURE — 97140 MANUAL THERAPY 1/> REGIONS: CPT | Mod: PN | Performed by: PHYSICAL THERAPIST

## 2025-07-07 NOTE — PROGRESS NOTES
Outpatient Rehab    Physical Therapy Visit    Patient Name: Doron Henderson  MRN: [id number]  YOB: 1955  Encounter Date: 7/7/2025    Therapy Diagnosis:   Encounter Diagnoses   Name Primary?    Chronic right-sided low back pain without sciatica Yes    Decreased range of motion     Muscle weakness      Physician: Zohaib Ceballos MD    Physician Orders: Eval and Treat  Medical Diagnosis: Dorsalgia  Lumbar spondylosis  Surgical Diagnosis: Not applicable for this Episode   Surgical Date: Not applicable for this Episode  Days Since Last Surgery: Not applicable for this Episode    Visit # / Visits Authorized:  2 / 10  Insurance Authorization Period: 6/23/2025 to 12/31/2025  Date of Evaluation: 6/24/2025  Plan of Care Certification: 6/24/2025 to 8/19/2025      PT/PTA: PT   Number of PTA visits since last PT visit:0  Time In: 0701   Time Out: 0805  Total Time (in minutes): 64   Total Billable Time (in minutes): 38    FOTO:  Intake Score: 60%  Survey Score 2:  %  Survey Score 3:  %    Precautions:     Abdominal hernia and standard      Subjective   Pt states overall feeling better little sore Pt states little stiff upon awakening 3/10 after a couple of minutes it loosens up Pt states currently no pain.  Pain reported as 0/10.      Objective      Spinal Mobility  Lumbosacral Mobility Details: Pelvic positioning: MD L at IE MD L             Treatment:  Therapeutic Exercise  TE 1: Hamstring stretch supine x 10  towel around thigh  TE 2: Piriformis stretch supine x 10  TE 3: LTR x 2/10  TE 10: Press up x 10  Manual Therapy  MT 1: P/A mob to lumbar spine mod tightness noted with spring  Balance/Neuromuscular Re-Education  NMR 1: Pt with dysfunction tender at iliac crest Improved after push/push  NMR 2: bridge x 20  pain with bridge did push/push led to relief of pain  NMR 3: Hip abduction SL x 20  NMR 4: Push/push x 3  NMR 5: SLR x 20  NMR 6: pelvic tilt x 2/10  NMR 7: hip ext prone x 20  Instructed pt to consider  pillow under stomach if increased pain noted after ex  NMR 8: + hip ext prone bent knee x 10  Therapeutic Activity  TA 1: Pt reports pillow under knees helped with sleep    Time Entry(in minutes):  Manual Therapy Time Entry: 8  Neuromuscular Re-Education Time Entry: 16  Therapeutic Activity Time Entry: 2  Therapeutic Exercise Time Entry: 12    Assessment & Plan   Assessment: Patient appears to understand increased awareness of symptoms and to perform push/pull at onset of increased symptoms.   Pt able to progress with strengthening and stabilization without adverse effect   pt unable to attend again until 7-16, but appears to have the tools he needs to continue to work on strengthening and stabilization.  Pt appears to understand to contact PT if any issues arise   Evaluation/Treatment Tolerance: Patient tolerated treatment well    The patient will continue to benefit from skilled outpatient physical therapy in order to address the deficits listed in the problem list on the initial evaluation, provide patient and family education, and maximize the patients level of independence in the home and community environments.     The patient's spiritual, cultural, and educational needs were considered, and the patient is agreeable to the plan of care and goals.     Education  Education was done with Patient. The patient's learning style includes Demonstration, Listening, and Pictures/video. The patient Demonstrates understanding and Verbalizes understanding.         - exercise in pain free zone - stretch to point of tightness not pain         Plan: Continue with treatment as tolerated as per POC.  Progress strength and stabilization as tolerated    Goals:   Active       Long term goals       Increase range of motion to 75%to 100% full  (Progressing)       Start:  06/24/25    Expected End:  08/19/25            Increase strength 1 muscle grade ' (Progressing)       Start:  06/24/25    Expected End:  08/19/25             Improve and stabilize proper pelvic positioning   (Progressing)       Start:  06/24/25    Expected End:  08/19/25            Restore ability to perform sit to supine and supine to sit transfer without increased pain (Progressing)       Start:  06/24/25    Expected End:  08/19/25            Restore ability to sit for ADL without increased pain  (Progressing)       Start:  06/24/25    Expected End:  08/19/25            Restore ability to drive without increased pain or difficulty   (Progressing)       Start:  06/24/25    Expected End:  08/19/25            Restore ability to sit for work without increased pain (Progressing)       Start:  06/24/25    Expected End:  08/19/25            Restore ability to perform ADL's and household activities independently and without increased pain   (Progressing)       Start:  06/24/25    Expected End:  08/19/25            Pt to be independent with HEP to improve mobility, strength, and endurance to be independent with ADL's   (Progressing)       Start:  06/24/25    Expected End:  08/19/25               Short term goals        Increase range of motion 25%  (Progressing)       Start:  06/24/25    Expected End:  07/22/25            Increase strength 1/2 muscle grade  (Progressing)       Start:  06/24/25    Expected End:  07/22/25            Improve postural awareness of pelvis to independently identify dysfunction with min assist from PT   (Progressing)       Start:  06/24/25    Expected End:  07/22/25            Be able to perform HEP with minimal cueing required   (Progressing)       Start:  06/24/25    Expected End:  07/22/25                Chio Joiner, PT

## 2025-07-07 NOTE — TELEPHONE ENCOUNTER
Pt verbalized understanding that he should notify Dr. Hobson if his BP is consistently over 130/85. Pt is also aware that he has an appt this Saturday for the BMP draw that Dr. Hobson requested. Pt understands that he should continue to take the cozaar as prescribed.

## 2025-07-12 ENCOUNTER — LAB VISIT (OUTPATIENT)
Dept: LAB | Facility: HOSPITAL | Age: 70
End: 2025-07-12
Attending: FAMILY MEDICINE
Payer: MEDICARE

## 2025-07-12 DIAGNOSIS — I10 HYPERTENSION, ESSENTIAL: ICD-10-CM

## 2025-07-12 LAB
ANION GAP (OHS): 9 MMOL/L (ref 8–16)
BUN SERPL-MCNC: 14 MG/DL (ref 8–23)
CALCIUM SERPL-MCNC: 9.4 MG/DL (ref 8.7–10.5)
CHLORIDE SERPL-SCNC: 106 MMOL/L (ref 95–110)
CO2 SERPL-SCNC: 21 MMOL/L (ref 23–29)
CREAT SERPL-MCNC: 1 MG/DL (ref 0.5–1.4)
GFR SERPLBLD CREATININE-BSD FMLA CKD-EPI: >60 ML/MIN/1.73/M2
GLUCOSE SERPL-MCNC: 86 MG/DL (ref 70–110)
POTASSIUM SERPL-SCNC: 4.4 MMOL/L (ref 3.5–5.1)
SODIUM SERPL-SCNC: 136 MMOL/L (ref 136–145)

## 2025-07-12 PROCEDURE — 36415 COLL VENOUS BLD VENIPUNCTURE: CPT | Mod: PO

## 2025-07-12 PROCEDURE — 80048 BASIC METABOLIC PNL TOTAL CA: CPT

## 2025-07-16 ENCOUNTER — CLINICAL SUPPORT (OUTPATIENT)
Dept: REHABILITATION | Facility: HOSPITAL | Age: 70
End: 2025-07-16
Payer: MEDICARE

## 2025-07-16 DIAGNOSIS — M62.81 MUSCLE WEAKNESS: ICD-10-CM

## 2025-07-16 DIAGNOSIS — G89.29 CHRONIC RIGHT-SIDED LOW BACK PAIN WITHOUT SCIATICA: Primary | ICD-10-CM

## 2025-07-16 DIAGNOSIS — M25.60 DECREASED RANGE OF MOTION: ICD-10-CM

## 2025-07-16 DIAGNOSIS — M54.50 CHRONIC RIGHT-SIDED LOW BACK PAIN WITHOUT SCIATICA: Primary | ICD-10-CM

## 2025-07-16 PROCEDURE — 97112 NEUROMUSCULAR REEDUCATION: CPT | Mod: PN | Performed by: PHYSICAL THERAPIST

## 2025-07-16 PROCEDURE — 97530 THERAPEUTIC ACTIVITIES: CPT | Mod: PN | Performed by: PHYSICAL THERAPIST

## 2025-07-16 NOTE — PROGRESS NOTES
Outpatient Rehab    Physical Therapy Visit    Patient Name: Doron Henderson  MRN: [id number]  YOB: 1955  Encounter Date: 7/16/2025    Therapy Diagnosis:   Encounter Diagnoses   Name Primary?    Chronic right-sided low back pain without sciatica Yes    Decreased range of motion     Muscle weakness      Physician: Zohaib Ceballos MD    Physician Orders: Eval and Treat  Medical Diagnosis: Dorsalgia  Lumbar spondylosis  Surgical Diagnosis: Not applicable for this Episode   Surgical Date: Not applicable for this Episode  Days Since Last Surgery: Not applicable for this Episode    Visit # / Visits Authorized:  3 / 10  Insurance Authorization Period: 6/23/2025 to 12/31/2025  Date of Evaluation: 6/24/2025  Plan of Care Certification: 6/24/2025 to 8/19/2025      PT/PTA: PT   Number of PTA visits since last PT visit:0  Time In: 0706   Time Out: 0805  Total Time (in minutes): 59   Total Billable Time (in minutes): 30    FOTO:  Intake Score: 60%  Survey Score 2: 63%  Survey Score 3: Not applicable for this Episode%    Precautions:  Additional Precautions and Protocol Details: Abdominal hernia and standard      Subjective   Pt states feeling better  Pt states wake up some soreness and do push/push and gives relief and able to move with less pain Pt states drove to [place] pain at end but better than before.  Pain reported as 0/10. R side of LB was area of soreness this AM  at end of session 0/10    Objective      Spinal Mobility  Lumbosacral Mobility Details: Pelvic positioning: level at IE TX L             Treatment:  Therapeutic Exercise  TE 1: Hamstring stretch supine x 10  towel around thigh  TE 2: Piriformis stretch supine x 10  TE 3: LTR x 2/10  TE 9: Further addressed pt questions regarding return to jogging and doing squats with light weight and effect on spine and to consider possibly doing less aggressive compressive activities for spine  TE 10: Press up x 10  Balance/Neuromuscular Re-Education  NMR 1:  Level pelvis  NMR 2: bridge x 20 no  pain with bridge  NMR 3: Hip abduction SL x 20  NMR 5: SLR x 20  NMR 6: pelvic tilt x 2/10  NMR 7: hip ext prone x 20  no pillow needed  NMR 8: hip ext prone bent knee x 20  Therapeutic Activity  TA 2: +Foam walk x 2 min led to dysfunction  TA 3: + heel raises x 2/10  TA 4: + sit to stand x 10  led to dysfunction and able to correct with sitting push/push  TA 10: +Push/push sitting x 3    Time Entry(in minutes):  Neuromuscular Re-Education Time Entry: 15  Therapeutic Activity Time Entry: 10  Therapeutic Exercise Time Entry: 5    Assessment & Plan   Assessment: Pt progressing well with program  Pt does go in dysfunction with [facility] activities and appears to understand need to consider push/push even if not starting to have symptoms yet, but to definitely perform with onset of increased symptoms and will benefit from having push/push sitting to use when unable to lie down  Pt appears to understand effect of jogging and squat with weight activities on spine and may defer to other activities such as active sit to stand and elliptical instead of jogging Pt to be out of town next week and understand to contact PT if any problems while out of town   Evaluation/Treatment Tolerance: Patient tolerated treatment well    The patient will continue to benefit from skilled outpatient physical therapy in order to address the deficits listed in the problem list on the initial evaluation, provide patient and family education, and maximize the patients level of independence in the home and community environments.     The patient's spiritual, cultural, and educational needs were considered, and the patient is agreeable to the plan of care and goals.     Education  Education was done with Patient. The patient's learning style includes Demonstration, Listening, and Pictures/video. The patient Demonstrates understanding and Verbalizes understanding.         - exercise in pain free zone - stretch to point  of tightness not pain         Plan: Continue with treatment as tolerated as per POC.  Progress strength and stabilization as tolerated  Reassess next session    Goals:   Active       Long term goals       Increase range of motion to 75%to 100% full  (Progressing)       Start:  06/24/25    Expected End:  08/19/25            Increase strength 1 muscle grade ' (Progressing)       Start:  06/24/25    Expected End:  08/19/25            Improve and stabilize proper pelvic positioning   (Progressing)       Start:  06/24/25    Expected End:  08/19/25            Restore ability to perform sit to supine and supine to sit transfer without increased pain (Progressing)       Start:  06/24/25    Expected End:  08/19/25            Restore ability to sit for ADL without increased pain  (Progressing)       Start:  06/24/25    Expected End:  08/19/25            Restore ability to drive without increased pain or difficulty   (Progressing)       Start:  06/24/25    Expected End:  08/19/25            Restore ability to sit for work without increased pain (Progressing)       Start:  06/24/25    Expected End:  08/19/25            Restore ability to perform ADL's and household activities independently and without increased pain   (Progressing)       Start:  06/24/25    Expected End:  08/19/25            Pt to be independent with HEP to improve mobility, strength, and endurance to be independent with ADL's   (Progressing)       Start:  06/24/25    Expected End:  08/19/25               Short term goals        Increase range of motion 25%  (Progressing)       Start:  06/24/25    Expected End:  07/22/25            Increase strength 1/2 muscle grade  (Progressing)       Start:  06/24/25    Expected End:  07/22/25            Improve postural awareness of pelvis to independently identify dysfunction with min assist from PT   (Progressing)       Start:  06/24/25    Expected End:  07/22/25            Be able to perform HEP with minimal cueing  required   (Progressing)       Start:  06/24/25    Expected End:  07/22/25                Chio Joiner, PT

## 2025-07-19 ENCOUNTER — PATIENT MESSAGE (OUTPATIENT)
Dept: FAMILY MEDICINE | Facility: CLINIC | Age: 70
End: 2025-07-19
Payer: MEDICARE

## 2025-07-31 ENCOUNTER — CLINICAL SUPPORT (OUTPATIENT)
Dept: REHABILITATION | Facility: HOSPITAL | Age: 70
End: 2025-07-31
Payer: MEDICARE

## 2025-07-31 DIAGNOSIS — M25.60 DECREASED RANGE OF MOTION: ICD-10-CM

## 2025-07-31 DIAGNOSIS — G89.29 CHRONIC RIGHT-SIDED LOW BACK PAIN WITHOUT SCIATICA: Primary | ICD-10-CM

## 2025-07-31 DIAGNOSIS — M62.81 MUSCLE WEAKNESS: ICD-10-CM

## 2025-07-31 DIAGNOSIS — M54.50 CHRONIC RIGHT-SIDED LOW BACK PAIN WITHOUT SCIATICA: Primary | ICD-10-CM

## 2025-07-31 PROCEDURE — 97530 THERAPEUTIC ACTIVITIES: CPT | Mod: PN | Performed by: PHYSICAL THERAPIST

## 2025-07-31 PROCEDURE — 97110 THERAPEUTIC EXERCISES: CPT | Mod: PN | Performed by: PHYSICAL THERAPIST

## 2025-07-31 NOTE — PROGRESS NOTES
Outpatient Rehab    Physical Therapy Progress Note    Patient Name: Doron Henderson  MRN: [id number]  YOB: 1955  Encounter Date: 7/31/2025    Therapy Diagnosis:   Encounter Diagnoses   Name Primary?    Chronic right-sided low back pain without sciatica Yes    Decreased range of motion     Muscle weakness      Physician: Zohaib Ceballos MD    Physician Orders: Eval and Treat  Medical Diagnosis: Dorsalgia  Lumbar spondylosis  Surgical Diagnosis: Not applicable for this Episode   Surgical Date: Not applicable for this Episode  Days Since Last Surgery: Not applicable for this Episode    Visit # / Visits Authorized:  4 / 10  Insurance Authorization Period: 6/23/2025 to 12/31/2025  Date of Evaluation: 6/24/2025  Plan of Care Certification: 6/24/2025 to 8/19/2025      PT/PTA: PT   Number of PTA visits since last PT visit:0  Time In: 0708   Time Out: 0804  Total Time (in minutes): 56   Total Billable Time (in minutes): 30    FOTO:  Intake Score (%): 60  Survey Score 2 (%): 63  Survey Score 3 (%): 63    Precautions:  Additional Precautions and Protocol Details: Abdominal hernia and standard    Subjective   Pt states he is has been doing pretty well   Pt states currently no pain Pt states he has had momentary pain  Pt states he drove 12 hours and for most part went well Pt states able to move around and recover from drive  Pt states still with pain with activities such as prepping food for cooking.  Pain reported as 0/10.      Objective   Posture                 Same as IE Forward head, rounded  and  IR shoulders, R shoulder high, R hip high,  increased lower thoracic kyphosis, flattened  lumbar lordosis , slight increased ER R      Spinal Mobility  Hypomobile: Thoracic and Lumbosacral  Lumbosacral Mobility Details: Pelvic positioning: level at IE IA L          Lumbar Range of Motion   Lumbar active range of motion in standing is: - flexion - distal 1/3 calf                     - extension -  75%                  "       - left side bending -  to knee        - right side bending -  to knee at IE       Lumbar active range of motion in standing is: - flexion - prox 1/3 calf    little pain LB                 - extension -  50%                        - left side bending -  1" above knee pain on R        - right side bending -  1" above knee pain on R              Hip Range of Motion    Flexibility testing: hamstrings:     90/90 test R 40 L 40 at IE R 55 L 45                     Hip Strength - Planes of Motion   Right Strength Right Pain Left Strength Left  Pain   Flexion (L2) 5   5     Extension 4+   4+     ABduction 5   5     ADduction           Internal Rotation           External Rotation               Hip Strength - Isolated Muscles   Right Strength Right Pain Left Strength Left  Pain   Gluteus Sergio 4+   4+     Gluteus Medius           Gluteus Minimus           Tensor Fasciae Latae             Knee Strength   Right Strength Right Pain Left Strength Left  Pain   Flexion (S2) 5   5     Prone Flexion           Extension (L3) 5   5                   Gait Analysis  Gait Analysis Details  No gait deficits except slight ER R LE at IE No gait deficits noted except slight increased ER R LE   Able to toe and heel walk           Treatment:  Therapeutic Exercise  TE 1: Reassessment Hamstring stretch supine x 10  towel around thigh  TE 2: Piriformis stretch supine x 10  TE 3: LTR x 2/10  Balance/Neuromuscular Re-Education  NMR 1: Level pelvis  NMR 2: bridge x 20 no  pain with bridge  NMR 3: Hip abduction SL x 20  NMR 4: Push/push x 3  NMR 5: SLR x 20  NMR 6: pelvic tilt x 2/10  NMR 7: hip ext prone x 20  no pillow needed  NMR 8: hip ext prone bent knee x 20  Therapeutic Activity  TA 2: Foam walk x 2 min  TA 3: heel raises x 2/10  TA 4: sit to stand x 10 no dysfunction after activity  TA 5: Instructed pt in proper posture with prepping and if unable to elevate prepping board, then every 10 min stand erect and BB in standing    Time " Entry(in minutes):  Neuromuscular Re-Education Time Entry: 7  Therapeutic Activity Time Entry: 8  Therapeutic Exercise Time Entry: 15    Assessment & Plan   Assessment: Patient demonstrates improvement in range of motion, strength, stabilization and function.   Pt with similar FOTO score though does feel improvement in function  Pt will benefit from continued emphasis on pelvic stabilization and progress as tolerated as he now returns from out of town  Evaluation/Treatment Tolerance: Patient tolerated treatment well    The patient will continue to benefit from skilled outpatient physical therapy in order to address the deficits listed in the problem list on the initial evaluation, provide patient and family education, and maximize the patients level of independence in the home and community environments.     The patient's spiritual, cultural, and educational needs were considered, and the patient is agreeable to the plan of care and goals.     Education  Education was done with Patient. The patient's learning style includes Demonstration and Listening. The patient Demonstrates understanding and Verbalizes understanding.         - exercise in pain free zone - stretch to point of tightness not pain         Plan: Continue with treatment as tolerated as per POC.  Progress strength and stabilization as tolerated    Goals:   Active       Long term goals       Increase range of motion to 75%to 100% full  (Progressing)       Start:  06/24/25    Expected End:  08/19/25            Increase strength 1 muscle grade ' (Progressing)       Start:  06/24/25    Expected End:  08/19/25            Improve and stabilize proper pelvic positioning   (Progressing)       Start:  06/24/25    Expected End:  08/19/25            Restore ability to perform sit to supine and supine to sit transfer without increased pain (Progressing)       Start:  06/24/25    Expected End:  08/19/25            Restore ability to sit for ADL without increased  pain  (Met)       Start:  06/24/25    Expected End:  08/19/25    Resolved:  07/31/25         Restore ability to drive without increased pain or difficulty   (Met)       Start:  06/24/25    Expected End:  08/19/25    Resolved:  07/31/25         Restore ability to sit for work without increased pain (Met)       Start:  06/24/25    Expected End:  08/19/25    Resolved:  07/31/25         Restore ability to perform ADL's and household activities independently and without increased pain   (Progressing)       Start:  06/24/25    Expected End:  08/19/25            Pt to be independent with HEP to improve mobility, strength, and endurance to be independent with ADL's   (Progressing)       Start:  06/24/25    Expected End:  08/19/25              Resolved       Short term goals        Increase range of motion 25%  (Met)       Start:  06/24/25    Expected End:  07/22/25    Resolved:  07/31/25         Increase strength 1/2 muscle grade  (Met)       Start:  06/24/25    Expected End:  07/22/25    Resolved:  07/31/25         Improve postural awareness of pelvis to independently identify dysfunction with min assist from PT   (Met)       Start:  06/24/25    Expected End:  07/22/25    Resolved:  07/31/25         Be able to perform HEP with minimal cueing required   (Met)       Start:  06/24/25    Expected End:  07/22/25    Resolved:  07/31/25             Chio Joiner, PT

## 2025-08-06 ENCOUNTER — CLINICAL SUPPORT (OUTPATIENT)
Dept: REHABILITATION | Facility: HOSPITAL | Age: 70
End: 2025-08-06
Payer: MEDICARE

## 2025-08-06 DIAGNOSIS — M54.50 CHRONIC RIGHT-SIDED LOW BACK PAIN WITHOUT SCIATICA: Primary | ICD-10-CM

## 2025-08-06 DIAGNOSIS — G89.29 CHRONIC RIGHT-SIDED LOW BACK PAIN WITHOUT SCIATICA: Primary | ICD-10-CM

## 2025-08-06 DIAGNOSIS — M62.81 MUSCLE WEAKNESS: ICD-10-CM

## 2025-08-06 DIAGNOSIS — M25.60 DECREASED RANGE OF MOTION: ICD-10-CM

## 2025-08-06 PROCEDURE — 97112 NEUROMUSCULAR REEDUCATION: CPT | Mod: PN | Performed by: PHYSICAL THERAPIST

## 2025-08-06 PROCEDURE — 97530 THERAPEUTIC ACTIVITIES: CPT | Mod: PN | Performed by: PHYSICAL THERAPIST

## 2025-08-06 PROCEDURE — 97110 THERAPEUTIC EXERCISES: CPT | Mod: PN | Performed by: PHYSICAL THERAPIST

## 2025-08-06 NOTE — PROGRESS NOTES
Outpatient Rehab    Physical Therapy Visit    Patient Name: Doron Henderson  MRN: [id number]  YOB: 1955  Encounter Date: 8/6/2025    Therapy Diagnosis:   Encounter Diagnoses   Name Primary?    Chronic right-sided low back pain without sciatica Yes    Decreased range of motion     Muscle weakness      Physician: Zohaib Ceballos MD    Physician Orders: Eval and Treat  Medical Diagnosis: Dorsalgia  Lumbar spondylosis  Surgical Diagnosis: Not applicable for this Episode   Surgical Date: Not applicable for this Episode  Days Since Last Surgery: Not applicable for this Episode    Visit # / Visits Authorized:  5 / 10  Insurance Authorization Period: 6/23/2025 to 12/31/2025  Date of Evaluation: 6/24/2025  Plan of Care Certification: 6/24/2025 to 8/19/2025      PT/PTA: PT   Number of PTA visits since last PT visit:0  Time In: 0700   Time Out: 0758  Total Time (in minutes): 58   Total Billable Time (in minutes): 58    FOTO:  Intake Score (%): 60  Survey Score 2 (%): 63  Survey Score 3 (%): 83    Precautions:  Additional Precautions and Protocol Details: Abdominal hernia and standard      Subjective   Overall been doing well  Pt states started elliptical yesterday and some soreness Pt states did normal upper body routine and did PT HEP for Lower body Pt states sore from elliptical  Pt states one to two times a day doing push/push outside of HEP.  Pain reported as 1/10.      Objective      Spinal Mobility  Lumbosacral Mobility Details: Pelvic positioning: level at IE SD L             Treatment:  Therapeutic Exercise  TE 1: Hamstring stretch supine x 10  towel around thigh  TE 2: Piriformis stretch supine x 10  TE 3: LTR x 2/10  TE 8: Discussed gym workout in subjective and instructed pt to continue with current weighted machines  Discussed cardio and instructed pt to continue with elliptical low level and to consider swimming and if he wants to bike to do recumbent bike not regular stat bike with FB posture  TE  10: Press up x 10  Balance/Neuromuscular Re-Education  NMR 1: Level pelvis  NMR 2: bridge x 20 no  pain with bridge  NMR 3: Hip abduction SL x 20  NMR 4: Push/push x 3  NMR 5: SLR x 20  NMR 6: pelvic tilt x 2/10  NMR 7: hip ext prone x 20  no pillow needed  NMR 8: hip ext prone bent knee x 20  Therapeutic Activity  TA 2: Foam walk x 2 min  TA 3: heel raises x 2/10  TA 4: sit to stand x 10 no dysfunction after activity  TA 5: Discussed prepping again and pt found he was able to work at higher counter without increased pain  Instructed pt to always use higher countertop then instead of one that leads to FB posture    Time Entry(in minutes):  Neuromuscular Re-Education Time Entry: 23  Therapeutic Activity Time Entry: 15  Therapeutic Exercise Time Entry: 20    Assessment & Plan   Assessment: Overall pt improving with symptoms and appears to understand need to continue with HEP and to focus on extension principles avoiding sustained FB posturing  Pt requires slight assistance still with hip abd isolation of glut med.  Pt with improved FOTO Pt to see Dr Ceballos next week and at follow up will consider DC to HEP  Evaluation/Treatment Tolerance: Patient tolerated treatment well    The patient will continue to benefit from skilled outpatient physical therapy in order to address the deficits listed in the problem list on the initial evaluation, provide patient and family education, and maximize the patients level of independence in the home and community environments.     The patient's spiritual, cultural, and educational needs were considered, and the patient is agreeable to the plan of care and goals.     Education  Education was done with Patient. The patient's learning style includes Demonstration and Listening. The patient Demonstrates understanding and Verbalizes understanding.         - exercise in pain free zone - stretch to point of tightness not pain         Plan: Reassess next visit.  Consider DC    Goals:   Active        Long term goals       Increase range of motion to 75%to 100% full  (Progressing)       Start:  06/24/25    Expected End:  08/19/25            Increase strength 1 muscle grade ' (Progressing)       Start:  06/24/25    Expected End:  08/19/25            Improve and stabilize proper pelvic positioning   (Met)       Start:  06/24/25    Expected End:  08/19/25    Resolved:  08/06/25         Restore ability to perform sit to supine and supine to sit transfer without increased pain (Progressing)       Start:  06/24/25    Expected End:  08/19/25            Restore ability to sit for ADL without increased pain  (Met)       Start:  06/24/25    Expected End:  08/19/25    Resolved:  07/31/25         Restore ability to drive without increased pain or difficulty   (Met)       Start:  06/24/25    Expected End:  08/19/25    Resolved:  07/31/25         Restore ability to sit for work without increased pain (Met)       Start:  06/24/25    Expected End:  08/19/25    Resolved:  07/31/25         Restore ability to perform ADL's and household activities independently and without increased pain   (Progressing)       Start:  06/24/25    Expected End:  08/19/25            Pt to be independent with HEP to improve mobility, strength, and endurance to be independent with ADL's   (Progressing)       Start:  06/24/25    Expected End:  08/19/25              Resolved       Short term goals        Increase range of motion 25%  (Met)       Start:  06/24/25    Expected End:  07/22/25    Resolved:  07/31/25         Increase strength 1/2 muscle grade  (Met)       Start:  06/24/25    Expected End:  07/22/25    Resolved:  07/31/25         Improve postural awareness of pelvis to independently identify dysfunction with min assist from PT   (Met)       Start:  06/24/25    Expected End:  07/22/25    Resolved:  07/31/25         Be able to perform HEP with minimal cueing required   (Met)       Start:  06/24/25    Expected End:  07/22/25    Resolved:   07/31/25             Chio Joiner, PT

## 2025-08-11 ENCOUNTER — OFFICE VISIT (OUTPATIENT)
Dept: PAIN MEDICINE | Facility: CLINIC | Age: 70
End: 2025-08-11
Payer: MEDICARE

## 2025-08-11 VITALS
HEART RATE: 78 BPM | SYSTOLIC BLOOD PRESSURE: 123 MMHG | WEIGHT: 223.56 LBS | BODY MASS INDEX: 35.01 KG/M2 | DIASTOLIC BLOOD PRESSURE: 71 MMHG

## 2025-08-11 DIAGNOSIS — M47.816 LUMBAR SPONDYLOSIS: Primary | ICD-10-CM

## 2025-08-11 DIAGNOSIS — M54.9 DORSALGIA: ICD-10-CM

## 2025-08-11 PROBLEM — M54.50 CHRONIC RIGHT-SIDED LOW BACK PAIN WITHOUT SCIATICA: Status: RESOLVED | Noted: 2025-06-24 | Resolved: 2025-08-11

## 2025-08-11 PROBLEM — G89.29 CHRONIC RIGHT-SIDED LOW BACK PAIN WITHOUT SCIATICA: Status: RESOLVED | Noted: 2025-06-24 | Resolved: 2025-08-11

## 2025-08-11 PROCEDURE — 99213 OFFICE O/P EST LOW 20 MIN: CPT | Mod: PBBFAC,PO | Performed by: ANESTHESIOLOGY

## 2025-08-11 PROCEDURE — 99999 PR PBB SHADOW E&M-EST. PATIENT-LVL III: CPT | Mod: PBBFAC,,, | Performed by: ANESTHESIOLOGY
